# Patient Record
Sex: MALE | Race: WHITE | NOT HISPANIC OR LATINO | Employment: FULL TIME | URBAN - METROPOLITAN AREA
[De-identification: names, ages, dates, MRNs, and addresses within clinical notes are randomized per-mention and may not be internally consistent; named-entity substitution may affect disease eponyms.]

---

## 2017-02-22 ENCOUNTER — TELEPHONE (OUTPATIENT)
Dept: UROLOGY | Facility: CLINIC | Age: 57
End: 2017-02-22

## 2017-03-27 NOTE — TELEPHONE ENCOUNTER
[facility] Prior Authorization Team   Phone: [phone]  Fax: [phone]      PA Initiation    Medication: cialis  Insurance Company: [employer] - Phone [phone] Fax [phone]  Pharmacy Filling the Rx: [id number] [place], [place] - [address] [place] [address] AT [address]  Filling Pharmacy Phone: [phone]  Filling Pharmacy Fax: [phone]  Start Date: 3/27/2017

## 2017-03-28 NOTE — TELEPHONE ENCOUNTER
PRIOR AUTHORIZATION DENIED    Medication: cialis- Denied    Denial Date: 3/27/2017    Denial Rational: Cialis is denied because this drug is excluded from the health plan.  If you feel there is additional information related to this case that might affect this decision and an appeal is desired, please submit a written letter of medical necessity to our PA Team.        Appeal Information:

## 2017-03-30 NOTE — TELEPHONE ENCOUNTER
PRIOR AUTHORIZATION DENIED    Medication: cialis- Denied    Denial Date: 3/29/2017    Denial Rational: Patient must have tried/failed or have a contraindication to the formulary alternatives: Tamsulosin, Alfuzosin Extended release, Doxazosin, Finasteride, Terazosin.  If you feel there is additional information related to this case that might affect this decision and an appeal is desired, please submit a written letter of medical necessity to our PA Team.          Appeal Information:

## 2017-11-27 ENCOUNTER — TRANSFERRED RECORDS (OUTPATIENT)
Dept: HEALTH INFORMATION MANAGEMENT | Facility: CLINIC | Age: 57
End: 2017-11-27

## 2018-03-16 ENCOUNTER — TRANSFERRED RECORDS (OUTPATIENT)
Dept: HEALTH INFORMATION MANAGEMENT | Facility: CLINIC | Age: 58
End: 2018-03-16

## 2019-07-17 ENCOUNTER — TELEPHONE (OUTPATIENT)
Dept: UROLOGY | Facility: CLINIC | Age: 59
End: 2019-07-17

## 2019-07-17 NOTE — TELEPHONE ENCOUNTER
[facility] Call Center    Phone Message    May a detailed message be left on voicemail: yes    Reason for Call: Medication Refill Request    Has the patient contacted the pharmacy for the refill? Yes   Name of medication being requested: Tadalafil (CIALIS) 5mg  Provider who prescribed the medication: Dr Simmons  Pharmacy: [facility] [address] [place] [address] phone [phone]  Date medication is needed: Please call pt to discuss. He stated he stopped using the medication for awhile and recently needed to start taking it again and the medication will be expiring soon per the bottle label. Please call the pt back as soon as you can.          Action Taken: Message routed to:  [facility]

## 2019-07-18 NOTE — TELEPHONE ENCOUNTER
Patient called again about his medication  Message sent to tanya to address Tram Shelley LPN Staff Nurse

## 2019-07-18 NOTE — TELEPHONE ENCOUNTER
Spoke with patient and told him that he has not been seen since 2016  Message sent to dr martínez regarding refill Tram Shelley, JANAY Staff Nurse

## 2019-07-18 NOTE — TELEPHONE ENCOUNTER
[facility] Call Center    Phone Message    May a detailed message be left on voicemail: yes    Reason for Call: Other: Yane calling to follow up on his refill request.  Please call him back with any updates     Action Taken: Message routed to:  [facility]

## 2019-07-22 ENCOUNTER — TELEPHONE (OUTPATIENT)
Dept: UROLOGY | Facility: CLINIC | Age: 59
End: 2019-07-22

## 2019-07-22 DIAGNOSIS — N40.0 BPH (BENIGN PROSTATIC HYPERPLASIA): Primary | ICD-10-CM

## 2019-07-22 RX ORDER — TADALAFIL 5 MG/1
5 TABLET ORAL EVERY 24 HOURS
Qty: 90 TABLET | Refills: 0 | Status: SHIPPED | OUTPATIENT
Start: 2019-07-22 | End: 2022-02-27

## 2019-07-22 NOTE — TELEPHONE ENCOUNTER
----- Message from Chuck Simmons MD sent at 7/19/2019 11:05 AM CDT -----  Yes  refill  ----- Message -----  From: Lili Shelley LPN  Sent: 7/17/2019   9:36 AM  To: Chuck Simmons MD    Patient requesting cialis again  He has not been seen since 2016  He has a lot of issues refuses to come in  Is it ok to refill?? lili

## 2019-07-22 NOTE — TELEPHONE ENCOUNTER
[facility] Call Center    Phone Message    May a detailed message be left on voicemail: yes    Reason for Call: Other: PT requested me to send another message to Tram stating he still has not heard anything regarding his medication refill.       Action Taken: Message routed to:  [facility] urology

## 2019-07-22 NOTE — TELEPHONE ENCOUNTER
Patient called and told he needs to make follow up appt in 3 months for any more refills on cialis. Tram Shelley LPN Staff Nurse

## 2019-08-23 ENCOUNTER — TRANSFERRED RECORDS (OUTPATIENT)
Dept: HEALTH INFORMATION MANAGEMENT | Facility: CLINIC | Age: 59
End: 2019-08-23

## 2019-10-01 ENCOUNTER — TELEPHONE (OUTPATIENT)
Dept: UROLOGY | Facility: CLINIC | Age: 59
End: 2019-10-01

## 2019-10-01 NOTE — TELEPHONE ENCOUNTER
Patient notified that we cannot refill his Cialis prescription. Patient has not been seen since 2016. Patient states that he have several appointments including surgeries. I offered him to schedule an appointment with another Urologist to get this prescription renewed since Dr. Chuck Simmons is out over the next month.  He states that he does not want to see anyone else but Dr. Chuck Simmons. Patient agreed with the plan about scheduling an appointment to renew his Cialis medication.      Jaylene Torres MA

## 2019-10-01 NOTE — TELEPHONE ENCOUNTER
[facility] Call Center    Phone Message    May a detailed message be left on voicemail: yes    Reason for Call: Other: Pt called and wanted to speak to Tram. Pt said that he left a message last week for her and have not heard anything back.  Pt said he will try again tomorrow. Pt said that Tram should know what it's regarding. Thanks     Action Taken: Message routed to:  [facility] urology clinic

## 2019-11-06 ENCOUNTER — TRANSFERRED RECORDS (OUTPATIENT)
Dept: HEALTH INFORMATION MANAGEMENT | Facility: CLINIC | Age: 59
End: 2019-11-06

## 2020-02-27 ENCOUNTER — TRANSFERRED RECORDS (OUTPATIENT)
Dept: HEALTH INFORMATION MANAGEMENT | Facility: CLINIC | Age: 60
End: 2020-02-27

## 2020-11-12 ENCOUNTER — TRANSFERRED RECORDS (OUTPATIENT)
Dept: HEALTH INFORMATION MANAGEMENT | Facility: CLINIC | Age: 60
End: 2020-11-12

## 2020-11-16 ENCOUNTER — TRANSFERRED RECORDS (OUTPATIENT)
Dept: HEALTH INFORMATION MANAGEMENT | Facility: CLINIC | Age: 60
End: 2020-11-16

## 2020-11-24 ENCOUNTER — OFFICE VISIT (OUTPATIENT)
Dept: OPHTHALMOLOGY | Facility: CLINIC | Age: 60
End: 2020-11-24
Attending: OPHTHALMOLOGY
Payer: COMMERCIAL

## 2020-11-24 ENCOUNTER — TELEPHONE (OUTPATIENT)
Dept: OPHTHALMOLOGY | Facility: CLINIC | Age: 60
End: 2020-11-24

## 2020-11-24 DIAGNOSIS — H53.40 VISUAL FIELD DEFECT: ICD-10-CM

## 2020-11-24 DIAGNOSIS — H53.40 VISUAL FIELD DEFECT: Primary | ICD-10-CM

## 2020-11-24 PROCEDURE — 99203 OFFICE O/P NEW LOW 30 MIN: CPT | Mod: GC | Performed by: OPHTHALMOLOGY

## 2020-11-24 PROCEDURE — 92083 EXTENDED VISUAL FIELD XM: CPT | Performed by: OPHTHALMOLOGY

## 2020-11-24 PROCEDURE — G0463 HOSPITAL OUTPT CLINIC VISIT: HCPCS | Performed by: TECHNICIAN/TECHNOLOGIST

## 2020-11-24 ASSESSMENT — CONF VISUAL FIELD
METHOD: COUNTING FINGERS
OS_SUPERIOR_NASAL_RESTRICTION: 3
OD_NORMAL: 1
OS_INFERIOR_TEMPORAL_RESTRICTION: 3

## 2020-11-24 ASSESSMENT — TONOMETRY
IOP_METHOD: ICARE
OS_IOP_MMHG: 14
OD_IOP_MMHG: 14

## 2020-11-24 ASSESSMENT — VISUAL ACUITY
OD_SC+: -1
METHOD: SNELLEN - LINEAR
OS_SC+: -2
OS_SC: 20/80
OD_SC: 20/25

## 2020-11-24 ASSESSMENT — EXTERNAL EXAM - RIGHT EYE: OD_EXAM: NORMAL

## 2020-11-24 ASSESSMENT — SLIT LAMP EXAM - LIDS
COMMENTS: NORMAL
COMMENTS: NORMAL

## 2020-11-24 ASSESSMENT — CUP TO DISC RATIO: OS_RATIO: 0.45

## 2020-11-24 ASSESSMENT — EXTERNAL EXAM - LEFT EYE: OS_EXAM: NORMAL

## 2020-11-24 NOTE — LETTER
2020       Michelet Gonzalez MD  [facility]  [address]  [facility] [address]  Via Fax: [phone]         RE:  :  MRN: Yane Haas  1960  [id number]     Dear Dr. Gonzalez,    Thank you for asking me to see your very pleasant patient, Yane Haas, in Neuro-ophthalmic consultation.  I would like to thank you for sending your records and I have summarized them in the history of present illness.  My assessment and plan are below.  For further details, please see my attached clinic note.      Assessment & Plan     Yane Haas is a 60-year-old male with the following diagnosis:   1. Visual field defect         Yane Haas is a 60-year-old White male who presents to Neuro-ophthalmology clinic today for consultation from [facility] for left eye vision change. Patient feels that the vision in his left eye has been declining in the last few months. He feels that the left eye vision fluctuates. There is intermittent pressure like pain in the left eye that makes him want to rub it.  Patient has multiple eye surgeries in the past with [facility].       POH:  Left eye:  PTK 2020  Conjunctivoresection 2019  LLL external blepharoplasty 2019  MARK punctoplasty 2019  PTK 2018  Therapeutic releaxing arcs 2018  Sulcus IOL 1/10/2018  PK 2016  PTK 12/10/2015  Vitrectomy 2014  PTK 2013  PRK 2013  PTK/PRK 2010    Both eyes:  Strabismus surgery in   PMH: diabetes type 1, HTN, HLD    FH:   NA    Meds:  LEFT eye  Alrex twice daily  Prednisolone once daily   Xiidra BID   Artificial tears PRN   Cosopt BID     Right eye:  Xiidra BID     Visual acuity is 20/25 right eye and 20/80 left eye. Intraocular pressure is 14 both eyes. Pupils briskly reactive with no afferent pupillary defect.  Color plates  RIGHT eye and 10/11 left eye. Slit lamp exam with endothelial pigment RIGHT eye and penetrating keratoplasty  (PK) graft with guttata left eye.  Dilated fundus exam shows normal results.     PAP left eye 20/20 -1     Visual fields showed normal results RIGHT eye and an arcuate defect LEFT eye.  There were 44% false negative errors in the LEFT eye.      It is my impression that he has reduced visual acuity LEFT eye.  He is status-post multiple ocular surgeries LEFT eye.  There is no evidence of an optic neuropathy.  His pupils and color vision are normal.  There is no optic atrophy.  He appears to have a potential acuity of 20/20 or 20/25 suggesting that most of his reduced acuity is the anterior segment.  Follow up with me as needed for worsening symptoms.       Again, thank you for allowing me to participate in the care of your patient.      Sincerely,    Buck Almeida MD  Professor  Ophthalmology Residency   Director of Neuro-Ophthalmology  [facility] Chair  Departments of Ophthalmology, Neurology, and Neurosurgery  [address]  [address]  T - [phone]  F - [phone]  [email or web address]      CC: Chuck Simmons MD  [address] [place] [address]  Via In Basket     Rosina Smiley RN  Via In Basket     Seven Munguia MD  [facility] [address] [facility] [address]  Via Fax: [phone]     Tram Ngo MD   [facility]  [address] [place] [address] [address]  [facility] [address]  Via Fax: [phone]

## 2020-11-24 NOTE — TELEPHONE ENCOUNTER
[facility] Call Center    Phone Message    May a detailed message be left on voicemail: yes     Reason for Call: Other: Pt left Diabetes testing kit in clinic 11/24/2020. Black pouch. Pt would like to come pick it up ASAP once it's been confirmed as still there. Please call Pt back to arrange safe pick-up. Thanks!     Action Taken: Message routed to:  Other: [facility]    Travel Screening: Not Applicable

## 2020-11-24 NOTE — Clinical Note
11/24/2020       RE: Yane Haas  [address] [facility] [address]     Dear Colleague,    Thank you for referring your patient, Yane Haas, to the [facility] at [facility]. Please see a copy of my visit note below.         Assessment & Plan     Yane Haas is a 60 year old male with the following diagnoses:   1. Visual field defect         Yane Haas is a 60 year old White male who presents to neuro-ophthalmology clinic today for consultation from [facility] for left eye vision change. Patient feels that the vision in his left eye has been declining in the last few months. He feels that the left eye vision fluctuates. There is intermittent pressure like pain in the left eye that makes him want to rub it.    Patient has multiple eye surgeries in the past with [facility].       POH:  Left eye:  PTK 6/2020  Conjunctivoresection 11/6/2019  LLL external blepharoplasty 5/8/2019  MARK punctoplasty 5/8/2019  PTK 9/6/2018  Therapeutic releaxing arcs 7/9/2018  Sulcus IOL 1/10/2018  PK 4/27/2016  PTK 12/10/2015  Vitrectomy 4/20/2014  PTK 7/26/2013  PRK 2/14/2013  PTK/PRK 11/2/2010    Both eyes:  Strabismus surgery in 1980  PMH: diabetes type 1, HTN, HLD    FH:   NA    Meds:  LEFT eye  Alrex twice daily  Prednisolone once daily   Xiidra BID   Artificial tears PRN   Cosopt BID     Right eye:  Xiidra BID     Visual acuity is 20/25 right and 20/80 left eye. Intraocular pressure is 14 both eyes. Pupils briskly reactive with no afferent pupillary defect.  Color plates 11/11 RIGHT eye and 10/11 left eye. Slit lamp exam with endothelial pigment RIGHT eye and penetrating keratoplasty (PK) graft with guttata left eye.  Dilated fundus exam shows normal results.     PAP left eye 20/20 -1     Visual fields showed normal results RIGHT eye and an arcuate defect LEFT eye.  There were 44% false negative errors in the LEFT eye.       It is my impression that he has reduced visual acuity LEFT eye.  He is status-post multiple ocular surgeries LEFT eye.  There is no evidence of an optic neuropathy.  His pupils and color vision are normal.  There is no optic atrophy.  He appears to have a potential acuity of 20/20 or 20/25 suggesting that most of his reduced acuity is the anterior segment.  Follow up with me as needed for worsening symptoms.           Attending Physician Attestation:  Complete documentation of historical and exam elements from today's encounter can be found in the full encounter summary report (not reduplicated in this progress note).  I personally obtained the chief complaint(s) and history of present illness.  I confirmed and edited as necessary the review of systems, past medical/surgical history, family history, social history, and examination findings as documented by others; and I examined the patient myself.  I personally reviewed the relevant tests, images, and reports as documented above.  I formulated and edited as necessary the assessment and plan and discussed the findings and management plan with the patient and family. I personally reviewed the ophthalmic test(s) associated with this encounter, agree with the interpretation(s) as documented by the resident/fellow, and have edited the corresponding report(s) as necessary.  - Buck Trevino MD  Ophthalmology PGY-3          Again, thank you for allowing me to participate in the care of your patient.      Sincerely,    Buck Almeida MD

## 2020-11-24 NOTE — TELEPHONE ENCOUNTER
Spoke to patient.  He will  in AM.  Will place behind the .      Jamila Garrett on 11/24/2020 at 5:05 PM

## 2020-12-10 NOTE — PROGRESS NOTES
Assessment & Plan     Yane Haas is a 60 year old male with the following diagnoses:   1. Visual field defect         Yane Haas is a 60 year old White male who presents to neuro-ophthalmology clinic today for consultation from [facility] for left eye vision change. Patient feels that the vision in his left eye has been declining in the last few months. He feels that the left eye vision fluctuates. There is intermittent pressure like pain in the left eye that makes him want to rub it.    Patient has multiple eye surgeries in the past with [facility].       POH:  Left eye:  PTK 6/2020  Conjunctivoresection 11/6/2019  LLL external blepharoplasty 5/8/2019  MARK punctoplasty 5/8/2019  PTK 9/6/2018  Therapeutic releaxing arcs 7/9/2018  Sulcus IOL 1/10/2018  PK 4/27/2016  PTK 12/10/2015  Vitrectomy 4/20/2014  PTK 7/26/2013  PRK 2/14/2013  PTK/PRK 11/2/2010    Both eyes:  Strabismus surgery in 1980  PMH: diabetes type 1, HTN, HLD    FH:   NA    Meds:  LEFT eye  Alrex twice daily  Prednisolone once daily   Xiidra BID   Artificial tears PRN   Cosopt BID     Right eye:  Xiidra BID     Visual acuity is 20/25 right and 20/80 left eye. Intraocular pressure is 14 both eyes. Pupils briskly reactive with no afferent pupillary defect.  Color plates 11/11 RIGHT eye and 10/11 left eye. Slit lamp exam with endothelial pigment RIGHT eye and penetrating keratoplasty (PK) graft with guttata left eye.  Dilated fundus exam shows normal results.     PAP left eye 20/20 -1     Visual fields showed normal results RIGHT eye and an arcuate defect LEFT eye.  There were 44% false negative errors in the LEFT eye.      It is my impression that he has reduced visual acuity LEFT eye.  He is status-post multiple ocular surgeries LEFT eye.  There is no evidence of an optic neuropathy.  His pupils and color vision are normal.  There is no optic atrophy.  He appears to have a potential acuity of 20/20 or  20/25 suggesting that most of his reduced acuity is the anterior segment.  Follow up with me as needed for worsening symptoms.           Attending Physician Attestation:  Complete documentation of historical and exam elements from today's encounter can be found in the full encounter summary report (not reduplicated in this progress note).  I personally obtained the chief complaint(s) and history of present illness.  I confirmed and edited as necessary the review of systems, past medical/surgical history, family history, social history, and examination findings as documented by others; and I examined the patient myself.  I personally reviewed the relevant tests, images, and reports as documented above.  I formulated and edited as necessary the assessment and plan and discussed the findings and management plan with the patient and family. I personally reviewed the ophthalmic test(s) associated with this encounter, agree with the interpretation(s) as documented by the resident/fellow, and have edited the corresponding report(s) as necessary.  - Buck Trevino MD  Ophthalmology PGY-3       Statement Selected

## 2022-02-08 DIAGNOSIS — Z11.59 ENCOUNTER FOR SCREENING FOR OTHER VIRAL DISEASES: Primary | ICD-10-CM

## 2022-02-24 ENCOUNTER — LAB (OUTPATIENT)
Dept: LAB | Facility: CLINIC | Age: 62
End: 2022-02-24
Attending: ORTHOPAEDIC SURGERY
Payer: COMMERCIAL

## 2022-02-24 DIAGNOSIS — Z11.59 ENCOUNTER FOR SCREENING FOR OTHER VIRAL DISEASES: ICD-10-CM

## 2022-02-24 PROCEDURE — U0005 INFEC AGEN DETEC AMPLI PROBE: HCPCS

## 2022-02-24 PROCEDURE — U0003 INFECTIOUS AGENT DETECTION BY NUCLEIC ACID (DNA OR RNA); SEVERE ACUTE RESPIRATORY SYNDROME CORONAVIRUS 2 (SARS-COV-2) (CORONAVIRUS DISEASE [COVID-19]), AMPLIFIED PROBE TECHNIQUE, MAKING USE OF HIGH THROUGHPUT TECHNOLOGIES AS DESCRIBED BY CMS-2020-01-R: HCPCS

## 2022-02-25 LAB — SARS-COV-2 RNA RESP QL NAA+PROBE: NEGATIVE

## 2022-02-27 RX ORDER — TIOTROPIUM BROMIDE 18 UG/1
18 CAPSULE ORAL; RESPIRATORY (INHALATION) DAILY
COMMUNITY
Start: 2021-01-25 | End: 2022-02-27

## 2022-02-27 RX ORDER — AZELASTINE 1 MG/ML
1 SPRAY, METERED NASAL 2 TIMES DAILY
COMMUNITY

## 2022-02-27 RX ORDER — PREDNISOLONE ACETATE 10 MG/ML
1 SUSPENSION/ DROPS OPHTHALMIC 2 TIMES DAILY
COMMUNITY

## 2022-02-27 RX ORDER — BRIMONIDINE TARTRATE AND TIMOLOL MALEATE 2; 5 MG/ML; MG/ML
1 SOLUTION OPHTHALMIC 2 TIMES DAILY
COMMUNITY

## 2022-02-27 RX ORDER — ASPIRIN 81 MG/1
81 TABLET ORAL EVERY EVENING
Status: ON HOLD | COMMUNITY
End: 2022-03-01

## 2022-02-27 RX ORDER — ESCITALOPRAM OXALATE 10 MG/1
10 TABLET ORAL DAILY
COMMUNITY

## 2022-02-27 RX ORDER — INSULIN DEGLUDEC 100 U/ML
11 INJECTION, SOLUTION SUBCUTANEOUS AT BEDTIME
COMMUNITY

## 2022-02-27 RX ORDER — CHLORAL HYDRATE 500 MG
2 CAPSULE ORAL DAILY
COMMUNITY

## 2022-02-27 RX ORDER — TIOTROPIUM BROMIDE 18 UG/1
18 CAPSULE ORAL; RESPIRATORY (INHALATION) DAILY
COMMUNITY

## 2022-02-27 RX ORDER — ROSUVASTATIN CALCIUM 40 MG/1
40 TABLET, COATED ORAL AT BEDTIME
COMMUNITY

## 2022-02-27 RX ORDER — TADALAFIL 5 MG/1
5 TABLET ORAL EVERY 24 HOURS
COMMUNITY

## 2022-02-27 NOTE — PROGRESS NOTES
PTA medications updated by Medication Scribe prior to surgery via phone call with patient (last doses completed by Nurse)     Medication history sources: Patient, Surescripts, H&P and Patient's home med list  In the past week, patient estimated taking medication this percent of the time: Greater than 90%  Adherence assessment: N/A Not Observed    Significant changes made to the medication list:  Patient reports no longer taking the following meds (med scribe removed from PTA med list): Atorvastatin, Lantus      Additional medication history information:   Patient brought own home meds: Spiriva Inahler Caps, Combigan Eye Drops, Azelastine Nasal Spray, Prednisolone Eye Suspension     Medication reconciliation completed by provider prior to medication history? No    Time spent in this activity: 30 minutes    The information provided in this note is only as accurate as the sources available at the time of update(s)    Prior to Admission medications    Medication Sig Last Dose Taking? Auth Provider   aspirin 81 MG EC tablet Take 81 mg by mouth every evening 2/27/2022 at pm Yes Reported, Patient   azelastine (ASTELIN) 0.1 % nasal spray Spray 1 spray into both nostrils 2 times daily 2/27/2022 at pm Yes Reported, Patient   brimonidine-timolol (COMBIGAN) 0.2-0.5 % ophthalmic solution Place 1 drop Into the left eye 2 times daily 2/27/2022 at pm Yes Reported, Patient   CYCLOSPORINE IN KLARITY OP Place 1 drop Into the left eye 2 times daily 2/27/2022 at pm Yes Reported, Patient   escitalopram (LEXAPRO) 10 MG tablet Take 10 mg by mouth daily  at am Yes Reported, Patient   fish oil-omega-3 fatty acids 1000 MG capsule Take 2 g by mouth daily 2/21/2022 at am Yes Reported, Patient   Insulin Aspart (NOVOLOG SC) Inject Subcutaneous 3 times daily (with meals) Sliding Scale  at prn Yes Reported, Patient   insulin degludec (TRESIBA FLEXTOUCH) 100 UNIT/ML pen Inject 11 Units Subcutaneous At Bedtime 2/27/2022 at pm Yes Reported, Patient    lisinopril (ZESTRIL) 10 MG tablet Take 10 mg by mouth daily  2/27/2022 at pm Yes Reported, Patient   Multiple Vitamin (MULTIVITAMIN PO) Take 1 capsule by mouth daily 2/27/2022 at am Yes Reported, Patient   prednisoLONE acetate (PRED FORTE) 1 % ophthalmic suspension Place 1 drop Into the left eye 2 times daily 2/27/2022 at pm Yes Reported, Patient   rosuvastatin (CRESTOR) 40 MG tablet Take 40 mg by mouth At Bedtime 2/27/2022 at pm Yes Reported, Patient   tadalafil (CIALIS) 5 MG tablet Take 5 mg by mouth every 24 hours  at am Yes Reported, Patient   tiotropium (SPIRIVA) 18 MCG inhaled capsule Inhale 18 mcg into the lungs daily 2/27/2022 at am Yes Reported, Patient   traZODone (DESYREL) 50 MG tablet Take 100 mg by mouth At Bedtime (2 x 50 mg) 2/27/2022 at pm Yes Reported, Patient     Medication history completed by:    Rc Bailon CPhT  Medication [facility]

## 2022-02-28 ENCOUNTER — ANESTHESIA (OUTPATIENT)
Dept: SURGERY | Facility: CLINIC | Age: 62
End: 2022-02-28
Payer: COMMERCIAL

## 2022-02-28 ENCOUNTER — APPOINTMENT (OUTPATIENT)
Dept: PHYSICAL THERAPY | Facility: CLINIC | Age: 62
End: 2022-02-28
Attending: ORTHOPAEDIC SURGERY
Payer: COMMERCIAL

## 2022-02-28 ENCOUNTER — APPOINTMENT (OUTPATIENT)
Dept: GENERAL RADIOLOGY | Facility: CLINIC | Age: 62
End: 2022-02-28
Attending: ORTHOPAEDIC SURGERY
Payer: COMMERCIAL

## 2022-02-28 ENCOUNTER — ANESTHESIA EVENT (OUTPATIENT)
Dept: SURGERY | Facility: CLINIC | Age: 62
End: 2022-02-28
Payer: COMMERCIAL

## 2022-02-28 ENCOUNTER — HOSPITAL ENCOUNTER (OUTPATIENT)
Facility: CLINIC | Age: 62
Discharge: HOME | End: 2022-03-01
Attending: ORTHOPAEDIC SURGERY | Admitting: ORTHOPAEDIC SURGERY
Payer: COMMERCIAL

## 2022-02-28 DIAGNOSIS — Z96.651 HISTORY OF PROSTHETIC UNICOMPARTMENTAL ARTHROPLASTY OF RIGHT KNEE: Primary | ICD-10-CM

## 2022-02-28 DIAGNOSIS — Z96.651 STATUS POST TOTAL KNEE REPLACEMENT, RIGHT: ICD-10-CM

## 2022-02-28 LAB
FASTING STATUS PATIENT QL REPORTED: YES
GLUCOSE BLD-MCNC: 139 MG/DL (ref 70–99)
GLUCOSE BLDC GLUCOMTR-MCNC: 110 MG/DL (ref 70–99)
GLUCOSE BLDC GLUCOMTR-MCNC: 117 MG/DL (ref 70–99)
GLUCOSE BLDC GLUCOMTR-MCNC: 127 MG/DL (ref 70–99)
GLUCOSE BLDC GLUCOMTR-MCNC: 242 MG/DL (ref 70–99)
GLUCOSE BLDC GLUCOMTR-MCNC: 293 MG/DL (ref 70–99)
GLUCOSE BLDC GLUCOMTR-MCNC: 83 MG/DL (ref 70–99)
GLUCOSE BLDC GLUCOMTR-MCNC: 89 MG/DL (ref 70–99)
GLUCOSE BLDC GLUCOMTR-MCNC: 90 MG/DL (ref 70–99)
POTASSIUM BLD-SCNC: 4.4 MMOL/L (ref 3.4–5.3)

## 2022-02-28 PROCEDURE — 278N000051 HC OR IMPLANT GENERAL: Performed by: ORTHOPAEDIC SURGERY

## 2022-02-28 PROCEDURE — 250N000011 HC RX IP 250 OP 636: Performed by: ORTHOPAEDIC SURGERY

## 2022-02-28 PROCEDURE — 258N000003 HC RX IP 258 OP 636: Performed by: ORTHOPAEDIC SURGERY

## 2022-02-28 PROCEDURE — 360N000077 HC SURGERY LEVEL 4, PER MIN: Performed by: ORTHOPAEDIC SURGERY

## 2022-02-28 PROCEDURE — 250N000013 HC RX MED GY IP 250 OP 250 PS 637: Performed by: NURSE PRACTITIONER

## 2022-02-28 PROCEDURE — 99207 PR CDG-CODE CATEGORY CHANGED: CPT | Performed by: NURSE PRACTITIONER

## 2022-02-28 PROCEDURE — 97116 GAIT TRAINING THERAPY: CPT | Mod: GP

## 2022-02-28 PROCEDURE — 250N000013 HC RX MED GY IP 250 OP 250 PS 637: Performed by: HOSPITALIST

## 2022-02-28 PROCEDURE — 258N000003 HC RX IP 258 OP 636: Performed by: NURSE ANESTHETIST, CERTIFIED REGISTERED

## 2022-02-28 PROCEDURE — 370N000017 HC ANESTHESIA TECHNICAL FEE, PER MIN: Performed by: ORTHOPAEDIC SURGERY

## 2022-02-28 PROCEDURE — 97530 THERAPEUTIC ACTIVITIES: CPT | Mod: GP

## 2022-02-28 PROCEDURE — 250N000009 HC RX 250: Performed by: NURSE ANESTHETIST, CERTIFIED REGISTERED

## 2022-02-28 PROCEDURE — 96372 THER/PROPH/DIAG INJ SC/IM: CPT | Mod: 59 | Performed by: NURSE PRACTITIONER

## 2022-02-28 PROCEDURE — 250N000025 HC SEVOFLURANE, PER MIN: Performed by: ORTHOPAEDIC SURGERY

## 2022-02-28 PROCEDURE — 250N000013 HC RX MED GY IP 250 OP 250 PS 637: Performed by: ORTHOPAEDIC SURGERY

## 2022-02-28 PROCEDURE — 99203 OFFICE O/P NEW LOW 30 MIN: CPT | Performed by: NURSE PRACTITIONER

## 2022-02-28 PROCEDURE — 258N000003 HC RX IP 258 OP 636: Performed by: ANESTHESIOLOGY

## 2022-02-28 PROCEDURE — 250N000009 HC RX 250: Performed by: ORTHOPAEDIC SURGERY

## 2022-02-28 PROCEDURE — 36415 COLL VENOUS BLD VENIPUNCTURE: CPT | Performed by: ANESTHESIOLOGY

## 2022-02-28 PROCEDURE — 272N000001 HC OR GENERAL SUPPLY STERILE: Performed by: ORTHOPAEDIC SURGERY

## 2022-02-28 PROCEDURE — 97161 PT EVAL LOW COMPLEX 20 MIN: CPT | Mod: GP

## 2022-02-28 PROCEDURE — 999N000063 XR KNEE PORT RIGHT 1/2 VIEWS: Mod: RT

## 2022-02-28 PROCEDURE — 82962 GLUCOSE BLOOD TEST: CPT

## 2022-02-28 PROCEDURE — 250N000011 HC RX IP 250 OP 636: Performed by: NURSE ANESTHETIST, CERTIFIED REGISTERED

## 2022-02-28 PROCEDURE — 84132 ASSAY OF SERUM POTASSIUM: CPT | Performed by: ANESTHESIOLOGY

## 2022-02-28 PROCEDURE — 999N000141 HC STATISTIC PRE-PROCEDURE NURSING ASSESSMENT: Performed by: ORTHOPAEDIC SURGERY

## 2022-02-28 PROCEDURE — 250N000011 HC RX IP 250 OP 636: Performed by: ANESTHESIOLOGY

## 2022-02-28 PROCEDURE — C1776 JOINT DEVICE (IMPLANTABLE): HCPCS | Performed by: ORTHOPAEDIC SURGERY

## 2022-02-28 PROCEDURE — 710N000009 HC RECOVERY PHASE 1, LEVEL 1, PER MIN: Performed by: ORTHOPAEDIC SURGERY

## 2022-02-28 PROCEDURE — 250N000012 HC RX MED GY IP 250 OP 636 PS 637: Performed by: NURSE PRACTITIONER

## 2022-02-28 PROCEDURE — 82947 ASSAY GLUCOSE BLOOD QUANT: CPT | Mod: 91 | Performed by: ANESTHESIOLOGY

## 2022-02-28 DEVICE — IMPLANTABLE DEVICE: Type: IMPLANTABLE DEVICE | Site: KNEE | Status: FUNCTIONAL

## 2022-02-28 DEVICE — KNEE OXFORD UNI FEMORAL MED: Type: IMPLANTABLE DEVICE | Site: KNEE | Status: FUNCTIONAL

## 2022-02-28 DEVICE — BONE CEMENT SIMPLEX FULL DOSE 6191-1-001: Type: IMPLANTABLE DEVICE | Site: KNEE | Status: FUNCTIONAL

## 2022-02-28 RX ORDER — SODIUM CHLORIDE, SODIUM LACTATE, POTASSIUM CHLORIDE, CALCIUM CHLORIDE 600; 310; 30; 20 MG/100ML; MG/100ML; MG/100ML; MG/100ML
INJECTION, SOLUTION INTRAVENOUS CONTINUOUS
Status: DISCONTINUED | OUTPATIENT
Start: 2022-02-28 | End: 2022-02-28 | Stop reason: HOSPADM

## 2022-02-28 RX ORDER — NALOXONE HYDROCHLORIDE 0.4 MG/ML
0.2 INJECTION, SOLUTION INTRAMUSCULAR; INTRAVENOUS; SUBCUTANEOUS
Status: DISCONTINUED | OUTPATIENT
Start: 2022-02-28 | End: 2022-03-01 | Stop reason: HOSPADM

## 2022-02-28 RX ORDER — FENTANYL CITRATE 0.05 MG/ML
50 INJECTION, SOLUTION INTRAMUSCULAR; INTRAVENOUS
Status: DISCONTINUED | OUTPATIENT
Start: 2022-02-28 | End: 2022-02-28 | Stop reason: HOSPADM

## 2022-02-28 RX ORDER — PROPOFOL 10 MG/ML
INJECTION, EMULSION INTRAVENOUS PRN
Status: DISCONTINUED | OUTPATIENT
Start: 2022-02-28 | End: 2022-02-28

## 2022-02-28 RX ORDER — BRIMONIDINE TARTRATE AND TIMOLOL MALEATE 2; 5 MG/ML; MG/ML
1 SOLUTION OPHTHALMIC 2 TIMES DAILY
Status: DISCONTINUED | OUTPATIENT
Start: 2022-02-28 | End: 2022-02-28

## 2022-02-28 RX ORDER — TRAZODONE HYDROCHLORIDE 100 MG/1
100 TABLET ORAL
Status: DISCONTINUED | OUTPATIENT
Start: 2022-02-28 | End: 2022-03-01 | Stop reason: HOSPADM

## 2022-02-28 RX ORDER — AMOXICILLIN 250 MG
1 CAPSULE ORAL 2 TIMES DAILY
Status: DISCONTINUED | OUTPATIENT
Start: 2022-02-28 | End: 2022-03-01 | Stop reason: HOSPADM

## 2022-02-28 RX ORDER — HYDROMORPHONE HCL IN WATER/PF 6 MG/30 ML
0.2 PATIENT CONTROLLED ANALGESIA SYRINGE INTRAVENOUS EVERY 5 MIN PRN
Status: DISCONTINUED | OUTPATIENT
Start: 2022-02-28 | End: 2022-02-28 | Stop reason: HOSPADM

## 2022-02-28 RX ORDER — PREDNISOLONE ACETATE 10 MG/ML
1 SUSPENSION/ DROPS OPHTHALMIC 2 TIMES DAILY
Status: DISCONTINUED | OUTPATIENT
Start: 2022-02-28 | End: 2022-03-01 | Stop reason: HOSPADM

## 2022-02-28 RX ORDER — ACETAMINOPHEN 325 MG/1
975 TABLET ORAL EVERY 8 HOURS
Status: DISCONTINUED | OUTPATIENT
Start: 2022-02-28 | End: 2022-03-01 | Stop reason: HOSPADM

## 2022-02-28 RX ORDER — ASPIRIN 81 MG/1
81 TABLET ORAL 2 TIMES DAILY
Status: DISCONTINUED | OUTPATIENT
Start: 2022-02-28 | End: 2022-03-01 | Stop reason: HOSPADM

## 2022-02-28 RX ORDER — TRANEXAMIC ACID 650 MG/1
1950 TABLET ORAL ONCE
Status: COMPLETED | OUTPATIENT
Start: 2022-02-28 | End: 2022-02-28

## 2022-02-28 RX ORDER — LIDOCAINE 40 MG/G
CREAM TOPICAL
Status: DISCONTINUED | OUTPATIENT
Start: 2022-02-28 | End: 2022-02-28 | Stop reason: HOSPADM

## 2022-02-28 RX ORDER — DIPHENHYDRAMINE HCL 25 MG
25 CAPSULE ORAL EVERY 6 HOURS PRN
Status: DISCONTINUED | OUTPATIENT
Start: 2022-02-28 | End: 2022-03-01 | Stop reason: HOSPADM

## 2022-02-28 RX ORDER — LIDOCAINE 40 MG/G
CREAM TOPICAL
Status: DISCONTINUED | OUTPATIENT
Start: 2022-02-28 | End: 2022-03-01 | Stop reason: HOSPADM

## 2022-02-28 RX ORDER — OXYCODONE HYDROCHLORIDE 5 MG/1
5 TABLET ORAL EVERY 4 HOURS PRN
Status: DISCONTINUED | OUTPATIENT
Start: 2022-02-28 | End: 2022-03-01

## 2022-02-28 RX ORDER — CEFAZOLIN SODIUM/WATER 2 G/20 ML
2 SYRINGE (ML) INTRAVENOUS
Status: COMPLETED | OUTPATIENT
Start: 2022-02-28 | End: 2022-02-28

## 2022-02-28 RX ORDER — ACETAMINOPHEN 325 MG/1
650 TABLET ORAL EVERY 4 HOURS PRN
Qty: 100 TABLET | Refills: 0 | Status: SHIPPED | OUTPATIENT
Start: 2022-02-28

## 2022-02-28 RX ORDER — BRIMONIDINE TARTRATE AND TIMOLOL MALEATE 2; 5 MG/ML; MG/ML
1 SOLUTION OPHTHALMIC 2 TIMES DAILY
Status: DISCONTINUED | OUTPATIENT
Start: 2022-02-28 | End: 2022-03-01 | Stop reason: HOSPADM

## 2022-02-28 RX ORDER — CEFAZOLIN SODIUM/WATER 2 G/20 ML
2 SYRINGE (ML) INTRAVENOUS SEE ADMIN INSTRUCTIONS
Status: DISCONTINUED | OUTPATIENT
Start: 2022-02-28 | End: 2022-02-28 | Stop reason: HOSPADM

## 2022-02-28 RX ORDER — POLYETHYLENE GLYCOL 3350 17 G/17G
17 POWDER, FOR SOLUTION ORAL DAILY
Status: DISCONTINUED | OUTPATIENT
Start: 2022-03-01 | End: 2022-03-01 | Stop reason: HOSPADM

## 2022-02-28 RX ORDER — NALOXONE HYDROCHLORIDE 0.4 MG/ML
0.4 INJECTION, SOLUTION INTRAMUSCULAR; INTRAVENOUS; SUBCUTANEOUS
Status: DISCONTINUED | OUTPATIENT
Start: 2022-02-28 | End: 2022-03-01 | Stop reason: HOSPADM

## 2022-02-28 RX ORDER — HYDROXYZINE HYDROCHLORIDE 25 MG/1
25 TABLET, FILM COATED ORAL EVERY 6 HOURS PRN
Status: DISCONTINUED | OUTPATIENT
Start: 2022-02-28 | End: 2022-02-28 | Stop reason: HOSPADM

## 2022-02-28 RX ORDER — LISINOPRIL 10 MG/1
10 TABLET ORAL AT BEDTIME
Status: DISCONTINUED | OUTPATIENT
Start: 2022-03-01 | End: 2022-03-01 | Stop reason: HOSPADM

## 2022-02-28 RX ORDER — AZELASTINE 1 MG/ML
1 SPRAY, METERED NASAL 2 TIMES DAILY
Status: DISCONTINUED | OUTPATIENT
Start: 2022-02-28 | End: 2022-03-01 | Stop reason: HOSPADM

## 2022-02-28 RX ORDER — TRAZODONE HYDROCHLORIDE 50 MG/1
50 TABLET, FILM COATED ORAL
Status: DISCONTINUED | OUTPATIENT
Start: 2022-02-28 | End: 2022-02-28

## 2022-02-28 RX ORDER — HYDRALAZINE HYDROCHLORIDE 20 MG/ML
2.5-5 INJECTION INTRAMUSCULAR; INTRAVENOUS EVERY 10 MIN PRN
Status: DISCONTINUED | OUTPATIENT
Start: 2022-02-28 | End: 2022-02-28 | Stop reason: HOSPADM

## 2022-02-28 RX ORDER — GLYCOPYRROLATE 0.2 MG/ML
INJECTION, SOLUTION INTRAMUSCULAR; INTRAVENOUS PRN
Status: DISCONTINUED | OUTPATIENT
Start: 2022-02-28 | End: 2022-02-28

## 2022-02-28 RX ORDER — OXYCODONE HYDROCHLORIDE 5 MG/1
5-10 TABLET ORAL
Qty: 30 TABLET | Refills: 0 | Status: SHIPPED | OUTPATIENT
Start: 2022-02-28 | End: 2022-03-01

## 2022-02-28 RX ORDER — HYDROMORPHONE HCL IN WATER/PF 6 MG/30 ML
0.4 PATIENT CONTROLLED ANALGESIA SYRINGE INTRAVENOUS
Status: DISCONTINUED | OUTPATIENT
Start: 2022-02-28 | End: 2022-03-01 | Stop reason: HOSPADM

## 2022-02-28 RX ORDER — ASPIRIN 81 MG/1
81 TABLET ORAL 2 TIMES DAILY
Qty: 60 TABLET | Refills: 0 | Status: SHIPPED | OUTPATIENT
Start: 2022-02-28

## 2022-02-28 RX ORDER — ONDANSETRON 4 MG/1
4 TABLET, ORALLY DISINTEGRATING ORAL EVERY 30 MIN PRN
Status: DISCONTINUED | OUTPATIENT
Start: 2022-02-28 | End: 2022-02-28 | Stop reason: HOSPADM

## 2022-02-28 RX ORDER — PROPOFOL 10 MG/ML
INJECTION, EMULSION INTRAVENOUS CONTINUOUS PRN
Status: DISCONTINUED | OUTPATIENT
Start: 2022-02-28 | End: 2022-02-28

## 2022-02-28 RX ORDER — AMOXICILLIN 250 MG
1-2 CAPSULE ORAL 2 TIMES DAILY
Qty: 30 TABLET | Refills: 0 | Status: SHIPPED | OUTPATIENT
Start: 2022-02-28

## 2022-02-28 RX ORDER — SODIUM CHLORIDE 9 MG/ML
INJECTION, SOLUTION INTRAVENOUS CONTINUOUS
Status: DISCONTINUED | OUTPATIENT
Start: 2022-02-28 | End: 2022-03-01 | Stop reason: HOSPADM

## 2022-02-28 RX ORDER — ONDANSETRON 4 MG/1
4 TABLET, ORALLY DISINTEGRATING ORAL EVERY 6 HOURS PRN
Status: DISCONTINUED | OUTPATIENT
Start: 2022-02-28 | End: 2022-03-01 | Stop reason: HOSPADM

## 2022-02-28 RX ORDER — VANCOMYCIN HYDROCHLORIDE 1 G/20ML
INJECTION, POWDER, LYOPHILIZED, FOR SOLUTION INTRAVENOUS PRN
Status: DISCONTINUED | OUTPATIENT
Start: 2022-02-28 | End: 2022-02-28 | Stop reason: HOSPADM

## 2022-02-28 RX ORDER — PREDNISOLONE ACETATE 10 MG/ML
1 SUSPENSION/ DROPS OPHTHALMIC 2 TIMES DAILY
Status: DISCONTINUED | OUTPATIENT
Start: 2022-02-28 | End: 2022-02-28

## 2022-02-28 RX ORDER — FENTANYL CITRATE 0.05 MG/ML
50 INJECTION, SOLUTION INTRAMUSCULAR; INTRAVENOUS EVERY 5 MIN PRN
Status: DISCONTINUED | OUTPATIENT
Start: 2022-02-28 | End: 2022-02-28 | Stop reason: HOSPADM

## 2022-02-28 RX ORDER — HYDROXYZINE HYDROCHLORIDE 25 MG/1
25 TABLET, FILM COATED ORAL EVERY 6 HOURS PRN
Status: DISCONTINUED | OUTPATIENT
Start: 2022-02-28 | End: 2022-03-01 | Stop reason: HOSPADM

## 2022-02-28 RX ORDER — CALCIUM CARBONATE 500 MG/1
500 TABLET, CHEWABLE ORAL 4 TIMES DAILY PRN
Status: DISCONTINUED | OUTPATIENT
Start: 2022-02-28 | End: 2022-03-01 | Stop reason: HOSPADM

## 2022-02-28 RX ORDER — ONDANSETRON 2 MG/ML
4 INJECTION INTRAMUSCULAR; INTRAVENOUS EVERY 30 MIN PRN
Status: DISCONTINUED | OUTPATIENT
Start: 2022-02-28 | End: 2022-02-28 | Stop reason: HOSPADM

## 2022-02-28 RX ORDER — ONDANSETRON 2 MG/ML
INJECTION INTRAMUSCULAR; INTRAVENOUS PRN
Status: DISCONTINUED | OUTPATIENT
Start: 2022-02-28 | End: 2022-02-28

## 2022-02-28 RX ORDER — ONDANSETRON 2 MG/ML
4 INJECTION INTRAMUSCULAR; INTRAVENOUS EVERY 6 HOURS PRN
Status: DISCONTINUED | OUTPATIENT
Start: 2022-02-28 | End: 2022-03-01 | Stop reason: HOSPADM

## 2022-02-28 RX ORDER — NICOTINE POLACRILEX 4 MG
15-30 LOZENGE BUCCAL
Status: DISCONTINUED | OUTPATIENT
Start: 2022-02-28 | End: 2022-03-01 | Stop reason: HOSPADM

## 2022-02-28 RX ORDER — OXYCODONE HYDROCHLORIDE 5 MG/1
10 TABLET ORAL EVERY 4 HOURS PRN
Status: DISCONTINUED | OUTPATIENT
Start: 2022-02-28 | End: 2022-03-01

## 2022-02-28 RX ORDER — CELECOXIB 200 MG/1
400 CAPSULE ORAL ONCE
Status: COMPLETED | OUTPATIENT
Start: 2022-02-28 | End: 2022-02-28

## 2022-02-28 RX ORDER — EPHEDRINE SULFATE 50 MG/ML
INJECTION, SOLUTION INTRAMUSCULAR; INTRAVENOUS; SUBCUTANEOUS PRN
Status: DISCONTINUED | OUTPATIENT
Start: 2022-02-28 | End: 2022-02-28

## 2022-02-28 RX ORDER — ROSUVASTATIN CALCIUM 20 MG/1
40 TABLET, COATED ORAL AT BEDTIME
Status: DISCONTINUED | OUTPATIENT
Start: 2022-02-28 | End: 2022-03-01 | Stop reason: HOSPADM

## 2022-02-28 RX ORDER — DEXTROSE MONOHYDRATE 25 G/50ML
25-50 INJECTION, SOLUTION INTRAVENOUS
Status: DISCONTINUED | OUTPATIENT
Start: 2022-02-28 | End: 2022-03-01 | Stop reason: HOSPADM

## 2022-02-28 RX ORDER — HYDROMORPHONE HCL IN WATER/PF 6 MG/30 ML
0.2 PATIENT CONTROLLED ANALGESIA SYRINGE INTRAVENOUS
Status: DISCONTINUED | OUTPATIENT
Start: 2022-02-28 | End: 2022-03-01 | Stop reason: HOSPADM

## 2022-02-28 RX ORDER — DIMENHYDRINATE 50 MG/ML
25 INJECTION, SOLUTION INTRAMUSCULAR; INTRAVENOUS
Status: DISCONTINUED | OUTPATIENT
Start: 2022-02-28 | End: 2022-02-28 | Stop reason: HOSPADM

## 2022-02-28 RX ORDER — DIAZEPAM 5 MG
5 TABLET ORAL EVERY 6 HOURS PRN
Status: DISCONTINUED | OUTPATIENT
Start: 2022-02-28 | End: 2022-03-01 | Stop reason: HOSPADM

## 2022-02-28 RX ORDER — ESCITALOPRAM OXALATE 10 MG/1
10 TABLET ORAL DAILY
Status: DISCONTINUED | OUTPATIENT
Start: 2022-03-01 | End: 2022-03-01 | Stop reason: HOSPADM

## 2022-02-28 RX ORDER — FENTANYL CITRATE 50 UG/ML
INJECTION, SOLUTION INTRAMUSCULAR; INTRAVENOUS PRN
Status: DISCONTINUED | OUTPATIENT
Start: 2022-02-28 | End: 2022-02-28

## 2022-02-28 RX ORDER — OXYCODONE HYDROCHLORIDE 5 MG/1
5 TABLET ORAL EVERY 4 HOURS PRN
Status: DISCONTINUED | OUTPATIENT
Start: 2022-02-28 | End: 2022-02-28 | Stop reason: HOSPADM

## 2022-02-28 RX ORDER — LABETALOL HYDROCHLORIDE 5 MG/ML
10 INJECTION, SOLUTION INTRAVENOUS
Status: DISCONTINUED | OUTPATIENT
Start: 2022-02-28 | End: 2022-02-28 | Stop reason: HOSPADM

## 2022-02-28 RX ORDER — CEFAZOLIN SODIUM 1 G/3ML
1 INJECTION, POWDER, FOR SOLUTION INTRAMUSCULAR; INTRAVENOUS EVERY 8 HOURS
Status: COMPLETED | OUTPATIENT
Start: 2022-02-28 | End: 2022-03-01

## 2022-02-28 RX ORDER — ACETAMINOPHEN 325 MG/1
975 TABLET ORAL ONCE
Status: DISCONTINUED | OUTPATIENT
Start: 2022-02-28 | End: 2022-02-28 | Stop reason: HOSPADM

## 2022-02-28 RX ORDER — BISACODYL 10 MG
10 SUPPOSITORY, RECTAL RECTAL DAILY PRN
Status: DISCONTINUED | OUTPATIENT
Start: 2022-02-28 | End: 2022-03-01 | Stop reason: HOSPADM

## 2022-02-28 RX ORDER — ACETAMINOPHEN 325 MG/1
650 TABLET ORAL EVERY 4 HOURS PRN
Status: DISCONTINUED | OUTPATIENT
Start: 2022-03-03 | End: 2022-03-01 | Stop reason: HOSPADM

## 2022-02-28 RX ORDER — BUPIVACAINE HYDROCHLORIDE AND EPINEPHRINE 2.5; 5 MG/ML; UG/ML
INJECTION, SOLUTION EPIDURAL; INFILTRATION; INTRACAUDAL; PERINEURAL PRN
Status: DISCONTINUED | OUTPATIENT
Start: 2022-02-28 | End: 2022-02-28 | Stop reason: HOSPADM

## 2022-02-28 RX ORDER — ACETAMINOPHEN 325 MG/1
975 TABLET ORAL ONCE
Status: COMPLETED | OUTPATIENT
Start: 2022-02-28 | End: 2022-02-28

## 2022-02-28 RX ADMIN — ACETAMINOPHEN 975 MG: 325 TABLET, FILM COATED ORAL at 21:49

## 2022-02-28 RX ADMIN — FENTANYL CITRATE 50 MCG: 50 INJECTION, SOLUTION INTRAMUSCULAR; INTRAVENOUS at 12:27

## 2022-02-28 RX ADMIN — PHENYLEPHRINE HYDROCHLORIDE 100 MCG: 10 INJECTION INTRAVENOUS at 14:05

## 2022-02-28 RX ADMIN — OXYCODONE HYDROCHLORIDE 10 MG: 5 TABLET ORAL at 23:02

## 2022-02-28 RX ADMIN — Medication 15 MG: at 12:02

## 2022-02-28 RX ADMIN — MIDAZOLAM 2 MG: 1 INJECTION INTRAMUSCULAR; INTRAVENOUS at 11:41

## 2022-02-28 RX ADMIN — BRIMONIDINE TARTRATE AND TIMOLOL MALEATE 1 DROP: 2; 5 SOLUTION OPHTHALMIC at 22:05

## 2022-02-28 RX ADMIN — AZELASTINE HYDROCHLORIDE 1 SPRAY: 137 SPRAY, METERED NASAL at 22:02

## 2022-02-28 RX ADMIN — FENTANYL CITRATE 50 MCG: 50 INJECTION, SOLUTION INTRAMUSCULAR; INTRAVENOUS at 15:31

## 2022-02-28 RX ADMIN — PHENYLEPHRINE HYDROCHLORIDE 50 MCG: 10 INJECTION INTRAVENOUS at 13:05

## 2022-02-28 RX ADMIN — PROPOFOL 200 MG: 10 INJECTION, EMULSION INTRAVENOUS at 11:50

## 2022-02-28 RX ADMIN — GLYCOPYRROLATE 0.2 MG: 0.2 INJECTION, SOLUTION INTRAMUSCULAR; INTRAVENOUS at 12:21

## 2022-02-28 RX ADMIN — SODIUM CHLORIDE, POTASSIUM CHLORIDE, SODIUM LACTATE AND CALCIUM CHLORIDE: 600; 310; 30; 20 INJECTION, SOLUTION INTRAVENOUS at 10:23

## 2022-02-28 RX ADMIN — PREDNISOLONE ACETATE 1 DROP: 10 SUSPENSION/ DROPS OPHTHALMIC at 22:25

## 2022-02-28 RX ADMIN — SENNOSIDES AND DOCUSATE SODIUM 1 TABLET: 50; 8.6 TABLET ORAL at 20:41

## 2022-02-28 RX ADMIN — ONDANSETRON 4 MG: 4 TABLET, ORALLY DISINTEGRATING ORAL at 23:02

## 2022-02-28 RX ADMIN — PROPOFOL 50 MCG/KG/MIN: 10 INJECTION, EMULSION INTRAVENOUS at 11:53

## 2022-02-28 RX ADMIN — PHENYLEPHRINE HYDROCHLORIDE 50 MCG: 10 INJECTION INTRAVENOUS at 12:55

## 2022-02-28 RX ADMIN — FENTANYL CITRATE 50 MCG: 50 INJECTION, SOLUTION INTRAMUSCULAR; INTRAVENOUS at 11:50

## 2022-02-28 RX ADMIN — TRAZODONE HYDROCHLORIDE 100 MG: 100 TABLET ORAL at 23:03

## 2022-02-28 RX ADMIN — Medication 2 G: at 11:41

## 2022-02-28 RX ADMIN — MIDAZOLAM 2 MG: 1 INJECTION INTRAMUSCULAR; INTRAVENOUS at 10:23

## 2022-02-28 RX ADMIN — ROSUVASTATIN CALCIUM 40 MG: 20 TABLET, FILM COATED ORAL at 21:49

## 2022-02-28 RX ADMIN — PHENYLEPHRINE HYDROCHLORIDE 100 MCG: 10 INJECTION INTRAVENOUS at 14:13

## 2022-02-28 RX ADMIN — PHENYLEPHRINE HYDROCHLORIDE 100 MCG: 10 INJECTION INTRAVENOUS at 12:02

## 2022-02-28 RX ADMIN — INSULIN DEGLUDEC INJECTION 11 UNITS: 100 INJECTION, SOLUTION SUBCUTANEOUS at 21:51

## 2022-02-28 RX ADMIN — CEFAZOLIN 1 G: 1 INJECTION, POWDER, FOR SOLUTION INTRAMUSCULAR; INTRAVENOUS at 20:42

## 2022-02-28 RX ADMIN — HYDROMORPHONE HYDROCHLORIDE 0.5 MG: 1 INJECTION, SOLUTION INTRAMUSCULAR; INTRAVENOUS; SUBCUTANEOUS at 12:31

## 2022-02-28 RX ADMIN — SODIUM CHLORIDE: 9 INJECTION, SOLUTION INTRAVENOUS at 18:13

## 2022-02-28 RX ADMIN — TRANEXAMIC ACID 1950 MG: 650 TABLET ORAL at 09:56

## 2022-02-28 RX ADMIN — Medication 10 MG: at 11:58

## 2022-02-28 RX ADMIN — HYDROMORPHONE HYDROCHLORIDE 0.2 MG: 0.2 INJECTION, SOLUTION INTRAMUSCULAR; INTRAVENOUS; SUBCUTANEOUS at 22:04

## 2022-02-28 RX ADMIN — INSULIN ASPART 12 UNITS: 100 INJECTION, SOLUTION INTRAVENOUS; SUBCUTANEOUS at 18:45

## 2022-02-28 RX ADMIN — ACETAMINOPHEN 975 MG: 325 TABLET, FILM COATED ORAL at 09:56

## 2022-02-28 RX ADMIN — PHENYLEPHRINE HYDROCHLORIDE 0.5 MCG/KG/MIN: 10 INJECTION INTRAVENOUS at 12:04

## 2022-02-28 RX ADMIN — PHENYLEPHRINE HYDROCHLORIDE 100 MCG: 10 INJECTION INTRAVENOUS at 13:59

## 2022-02-28 RX ADMIN — ASPIRIN 81 MG: 81 TABLET, COATED ORAL at 20:41

## 2022-02-28 RX ADMIN — ONDANSETRON 4 MG: 2 INJECTION INTRAMUSCULAR; INTRAVENOUS at 13:52

## 2022-02-28 RX ADMIN — OXYCODONE HYDROCHLORIDE 5 MG: 5 TABLET ORAL at 18:27

## 2022-02-28 RX ADMIN — UMECLIDINIUM 1 PUFF: 62.5 AEROSOL, POWDER ORAL at 18:47

## 2022-02-28 RX ADMIN — SODIUM CHLORIDE, POTASSIUM CHLORIDE, SODIUM LACTATE AND CALCIUM CHLORIDE: 600; 310; 30; 20 INJECTION, SOLUTION INTRAVENOUS at 13:15

## 2022-02-28 RX ADMIN — CELECOXIB 400 MG: 200 CAPSULE ORAL at 09:56

## 2022-02-28 ASSESSMENT — LIFESTYLE VARIABLES: TOBACCO_USE: 0

## 2022-02-28 NOTE — ANESTHESIA PROCEDURE NOTES
Adductor canal (targeting saphenous nerve) Procedure Note    Pre-Procedure   Staff -        Anesthesiologist:  Clay Hopper MD       Performed By: anesthesiologist       Location: pre-op       Pre-Anesthestic Checklist: patient identified, IV checked, site marked, risks and benefits discussed, informed consent, monitors and equipment checked, pre-op evaluation, at physician/surgeon's request and post-op pain management  Timeout:       Correct Patient: Yes        Correct Procedure: Yes        Correct Site: Yes        Correct Position: Yes        Correct Laterality: Yes        Site Marked: Yes  Procedure Documentation  Procedure: Adductor canal (targeting saphenous nerve)       Patient Position: supine       Skin prep: Chloraprep       Local skin infiltrated with 1 mL of 1% lidocaine.        Needle Type: insulated       Needle Gauge: 21.        Needle Length (millimeters): 90        Ultrasound guided       1. Ultrasound was used to identify targeted nerve, plexus, vascular marker, or fascial plane and place a needle adjacent to it in real-time.       2. Ultrasound was used to visualize the spread of anesthetic in close proximity to the above referenced structure.       3. A permanent image is entered into the patient's record.       4. The visualized anatomic structures appeared normal.       5. There were no apparent abnormal pathologic findings.    Assessment/Narrative         The placement was negative for: blood aspirated, painful injection and site bleeding       Paresthesias: No.     Bolus given via needle..        Secured via.        Insertion/Infusion Method: Single Shot       Complications: none     Comments:  Bolus via needle, 15 mL of 0.5% ropivacaine with 1:400,000 epinephrine.    Under ultrasound guidance, a 21 gauge needle was inserted and placed in close proximity to the saphenous nerve. Ultrasound was also used to visualize the spread of anesthetic in close proximity to the nerve being  blocked. The nerve appeared anatomically normal, and there were no apparent abnormal pathological findings. Patient tolerated well, was mildly sedated but communicative throughout the procedure. A permanent ultrasound image was saved in the patient's record.    The surgeon has given a verbal order transferring care of this patient to me for the performance of regional analgesia block for post op pain control. It is requested of me because I am uniquely trained and qualified to perform this block and the surgeon is neither trained nor qualified to perform this procedure.

## 2022-02-28 NOTE — CONSULTS
[facility]  Consult Note - Hospitalist Service     Date of Admission:  2/28/2022  Consult Requested by: Dr. Galvin  Reason for Consult: Post-op diabetic and medical management  PRIMARY CARE PROVIDER:    Tristan Jason    Assessment & Plan   Yane MENENDEZ Waldo is a 61 year old male with a past medical history significant for controlled DM Type 1, HTN, HLD, anxiety, obstructive lung disease, and OA of the right knee. He was admitted on 2/28/22 and underwent elective right unicompartmental knee arthroplast with Dr. Galvin. EBL of 25 ml and uncomplicated operative course. Hospitalist service was consulted to assist with post-op medical and diabetic management.       POD #0 s/p Right Unicompartmental Knee Arthroplasty  OA Right Knee  Uncomplicated operative course. EBL of 25 ml. Pain controlled with PRN meds.    PLAN:  -Post-op management as per ortho  -Pain meds, activity, anticoagulation, IVF fluids, therapies as per Ortho  -PT/OT  -BM regimen  -PRN pain meds  -IS    DM Type 1 with Proliferative Retinopathy of Both Eyes and Macular Edema  Follows OP with Endocrinology. PTA regimen of Tresiba 11 units at night and sliding scale insulin TID. Will repeat A1c in AM. Has had issues with hypoglycemia according to chart review from his last Endo visit on 2/24/22.     PLAN:  -Glucose checks as per order  -Medium scale sliding scale insulin with carb coverage  -DM diet  -Long acting insulin at PTA dose of 11 units  -Adjust insulin for hyper/hypoglycemia  -Resume PTA eye drops--pt may use home supply that he brought with him  -OP follow-up with Endocrinology, as needed  -Resume PTA regimen at discharge    Hypertension  Vitally stable. BP's in the low 100's over 60's. On lisinopril PTA. Will resume with hold parameters in place. Monitor VS.    Anxiety  Noted and stable. Resume PTA lexapro.     Obstructive Lung Disease  Noted. Not on chronic oxygen at home. Currently on nasal cannula post op.      PLAN:  -Pulmonary toileting  -Resume PTA Spiriva--will utilize hospital substitution  -Wean oxygen to maintain sats >90%  -Cough and deep breathe, IS    HLD  Noted. Resume statin.      Diet: Discharge Instruction - Regular Diet Adult  Moderate Consistent Carb (60 g CHO per Meal) Diet     DVT Prophylaxis: As per Ortho  Hernandes Catheter: Not present  Code Status: Full Code     Disposition Plan    Expected Discharge: TBD, pending clinical course and therapy recommendations. Anticipate discharge in the next 24-48 hours, pending medical stability.  Anticipated discharge location: home    Delays:         Entered: Jamia Martin NP 02/28/2022, 5:22 PM        The patient's care was discussed with the Patient.    The patient has been discussed with Dr. Goetz, who agrees with the assessment and plan at this time.      The hospitalist service would like to thank Orthopedics for the consult.  We will continue to follow.         Jamia Martin NP  [facility]    ______________________________________________________________________    Chief Complaint   Post-op medical and DM managment    History is obtained from the patient and EMR.      History of Present Illness   Yane Haas is a 61 year old male with a past medical history significant for controlled DM Type 1, HTN, HLD, anxiety, obstructive lung disease, and OA of the right knee. He was admitted on 2/28/22 and underwent elective right unicompartmental knee arthroplast with Dr. Galvin. EBL of 25 ml and uncomplicated operative course. Hospitalist service was consulted to assist with post-op medical and diabetic management.     Patient is resting in bed. He is having pain in the right knee. He is unclear as to the plans for therapy and equipment that he might need at discharge. He states that he has plenty of help at home and lives with his wife. States that he has had 19 eye surgeries with more pending. Has some blurriness of vision and this is not  new. He brought in his home supply of eye drops. He is hungry. Denies further complaints.    Review of Systems   The 10 point Review of Systems is negative other than noted in the HPI.    Past Medical History    I have reviewed this patient's medical history and updated it with pertinent information if needed.   Past Medical History:   Diagnosis Date     Anxiety      Diabetes type 1, controlled (H)     Type 1 dx 2010     Hyperlipidemia      Hypertension     will hold lisinopril am 6-29-16     Obstructive lung disease (H)      Peyronie disease        Past Surgical History   I have reviewed this patient's surgical history and updated it with pertinent information if needed.  Past Surgical History:   Procedure Laterality Date     ARTHROSCOPY KNEE WITH MENISCAL REPAIR Bilateral      EYE SURGERY      Multiple left eye procedures including corneal revision and transplant      GENITOURINARY SURGERY      Cysto 1-2 years ago before peyronies started     AAMIR PROCEDURE N/A 6/29/2016    Procedure: AAMIR PROCEDURE;  Surgeon: Chuck Simmons MD;  Location:  OR     [facility] REVISE CORNEA WITH IMPLANT         Social History   I have reviewed this patient's social history and updated it with pertinent information if needed.  Patient resides at home.   Social History     Tobacco Use     Smoking status: Never Smoker     Smokeless tobacco: Never Used   Vaping Use     Vaping Use: Never used   Substance Use Topics     Alcohol use: No     Drug use: No       Family History   I have reviewed this patient's family history and updated it with pertinent information if needed.   History reviewed. No pertinent family history.    Medications   Prior to Admission Medications   Prescriptions Last Dose Informant Patient Reported? Taking?   CYCLOSPORINE IN KLARITY OP 2/27/2022 at pm Self Yes Yes   Sig: Place 1 drop Into the left eye 2 times daily   Insulin Aspart (NOVOLOG SC)  at prn Self Yes Yes   Sig: Inject Subcutaneous 3 times daily (with  meals) Sliding Scale   Multiple Vitamin (MULTIVITAMIN PO) 2/27/2022 at am Self Yes Yes   Sig: Take 1 capsule by mouth daily   aspirin 81 MG EC tablet 2/27/2022 at pm Self Yes Yes   Sig: Take 81 mg by mouth every evening   azelastine (ASTELIN) 0.1 % nasal spray 2/27/2022 at pm Self Yes Yes   Sig: Spray 1 spray into both nostrils 2 times daily   brimonidine-timolol (COMBIGAN) 0.2-0.5 % ophthalmic solution 2/27/2022 at pm Self Yes Yes   Sig: Place 1 drop Into the left eye 2 times daily   escitalopram (LEXAPRO) 10 MG tablet 2/28/2022 at 0630 Self Yes Yes   Sig: Take 10 mg by mouth daily   fish oil-omega-3 fatty acids 1000 MG capsule 2/21/2022 at am Self Yes Yes   Sig: Take 2 g by mouth daily   insulin degludec (TRESIBA FLEXTOUCH) 100 UNIT/ML pen 2/27/2022 at pm Self Yes Yes   Sig: Inject 11 Units Subcutaneous At Bedtime   lisinopril (ZESTRIL) 10 MG tablet 2/27/2022 at pm Self Yes Yes   Sig: Take 10 mg by mouth daily    prednisoLONE acetate (PRED FORTE) 1 % ophthalmic suspension 2/27/2022 at pm Self Yes Yes   Sig: Place 1 drop Into the left eye 2 times daily   rosuvastatin (CRESTOR) 40 MG tablet 2/27/2022 at pm Self Yes Yes   Sig: Take 40 mg by mouth At Bedtime   tadalafil (CIALIS) 5 MG tablet  at am Self Yes Yes   Sig: Take 5 mg by mouth every 24 hours   tiotropium (SPIRIVA) 18 MCG inhaled capsule 2/27/2022 at am Self Yes Yes   Sig: Inhale 18 mcg into the lungs daily   traZODone (DESYREL) 50 MG tablet 2/27/2022 at pm Self Yes Yes   Sig: Take 100 mg by mouth At Bedtime (2 x 50 mg)      Facility-Administered Medications: None     Allergies   Allergies   Allergen Reactions     Codeine Other (See Comments)     Had it as a child. Trouble breathing. Has tolerated percocet.     Compazine Other (See Comments)     Had as a child. Trouble breathing.     Tacrolimus        Physical Exam   Vital Signs: Temp: 97.2  F (36.2  C) Temp src: Temporal BP: 101/63 Pulse: 78   Resp: 9 SpO2: 97 % O2 Device: None (Room air) Oxygen Delivery: 3  LPM  Weight: 164 lbs 14.4 oz    Constitutional: Awake, alert, cooperative, no apparent distress.    ENT: Normocephalic, without obvious abnormality, atraumatic, oral pharynx with moist mucus membranes, tonsils without erythema or exudates.  Eyes pupils are equal, round and reactive to light; extra occular movements intact.  Normal sclera.    Neck: Supple, symmetrical, trachea midline, no adenopathy.  Pulmonary: No increased work of breathing, good air exchange, clear to auscultation bilaterally, no crackles or wheezing. On nasal cannula.  Cardiovascular: Regular rate and rhythm, normal S1 and S2, no S3 or S4, and no murmur noted.  GI: Normal bowel sounds, soft, non-distended, non-tender.    Skin/Integumen: Clear.  Neuro: CN II-XII grossly intact.  Upper and lower extremities strength, coordination and sensation intact bilaterally.    Psych:  Alert and oriented x 3. Normal affect.  Extremities: No lower extremity edema noted, and calves are non-tender to palpation bilaterally. Dorsal pedal pulses and posterior tibial pulses palpable.  Ace wrap with RLE and ice pack to right knee.      Data   Results for orders placed or performed during the hospital encounter of 02/28/22 (from the past 24 hour(s))   Potassium   Result Value Ref Range    Potassium 4.4 3.4 - 5.3 mmol/L   Glucose   Result Value Ref Range    Glucose 139 (H) 70 - 99 mg/dL    Patient Fasting > 8hrs? Yes    Glucose by meter   Result Value Ref Range    GLUCOSE BY METER POCT 89 70 - 99 mg/dL   Glucose by meter   Result Value Ref Range    GLUCOSE BY METER POCT 83 70 - 99 mg/dL   Glucose by meter   Result Value Ref Range    GLUCOSE BY METER POCT 90 70 - 99 mg/dL   Glucose by meter   Result Value Ref Range    GLUCOSE BY METER POCT 110 (H) 70 - 99 mg/dL   Glucose by meter   Result Value Ref Range    GLUCOSE BY METER POCT 117 (H) 70 - 99 mg/dL   XR Knee Port Right 1/2 Views    Narrative    XR PORTABLE RIGHT KNEE ONE-TWO VIEWS   2/28/2022 3:09 PM     HISTORY: Knee  arthroplasty. Postop imaging.    COMPARISON: None.      Impression    IMPRESSION: Medial hemiarthroplasty in place. No evidence of  complication.       Jamia Martin NP  [facility]  Securely message with the Real Time [employer] Web Console (learn more here)  Text page via [employer] Paging/Directory

## 2022-02-28 NOTE — ANESTHESIA CARE TRANSFER NOTE
Patient: Yane Haas    Procedure: Procedure(s):  RIGHT UNICOMPARTMENTAL KNEE ARTHROPLASTY       Diagnosis: Osteoarthritis of right knee, unspecified osteoarthritis type [M17.11]  Diagnosis Additional Information: No value filed.    Anesthesia Type:   General     Note:    Oropharynx: oropharynx clear of all foreign objects  Level of Consciousness: drowsy  Oxygen Supplementation: face mask  Level of Supplemental Oxygen (L/min / FiO2): 6  Independent Airway: airway patency satisfactory and stable  Dentition: dentition unchanged  Vital Signs Stable: post-procedure vital signs reviewed and stable  Report to RN Given: handoff report given  Patient transferred to: PACU  Comments: To PACU: Drowsy, good airway, 02 face mask, VSS  Report to RN  Handoff Report: Identifed the Patient, Identified the Reponsible Provider, Reviewed the pertinent medical history, Discussed the surgical course, Reviewed Intra-OP anesthesia mangement and issues during anesthesia, Set expectations for post-procedure period and Allowed opportunity for questions and acknowledgement of understanding      Vitals:  Vitals Value Taken Time   BP 99/72 02/28/22 1450   Temp     Pulse 78 02/28/22 1451   Resp 9 02/28/22 1451   SpO2 99 % 02/28/22 1451   Vitals shown include unvalidated device data.    Electronically Signed By: SADE Calvillo CRNA  February 28, 2022  2:52 PM

## 2022-02-28 NOTE — BRIEF OP NOTE
[facility]    Brief Operative Note    Pre-operative diagnosis: Osteoarthritis of right knee, unspecified osteoarthritis type [M17.11]  Post-operative diagnosis Same as pre-operative diagnosis    Procedure: Procedure(s):  RIGHT UNICOMPARTMENTAL KNEE ARTHROPLASTY  Surgeon: Surgeon(s) and Role:     * Chace Galvin MD - Primary     * Deloris Salazar PA-C - Assisting  Anesthesia: Combined General with Femoral Nerve Block   Estimated Blood Loss: Less than 50 ml    Drains: None  Specimens: * No specimens in log *  Findings:   None.  Complications: None.  Implants:   Implant Name Type Inv. Item Serial No.  Lot No. LRB No. Used Action   BONE CEMENT SIMPLEX FULL DOSE 6191-1-001 - [id number] Cement, Bone BONE CEMENT SIMPLEX FULL DOSE 6191-1-001  [employer] [id number] Right 1 Implanted   KNEE UNI TIBIA TRAY MOBILE BEAR D5 RM/LL - [id number] Total Joint Component/Insert KNEE UNI TIBIA TRAY MOBILE BEAR D5 RM/LL  [employer] [id number] Right 1 Implanted   KNEE OXFORD UNI FEMORAL MED - [id number] Total Joint Component/Insert KNEE OXFORD UNI FEMORAL MED  [employer] [id number] Right 1 Implanted   OXFORD PARTIAL KNEE SYSTEM, ANATOMIC MENISCAL BEARING SIZE: MEDIUM, 3MM THICK    [employer] [id number] Right 1 Implanted

## 2022-02-28 NOTE — PROVIDER NOTIFICATION
MD Notification    Notified Person: MD    Notified Person Name: Dr. Galvin     Notification Date/Time: 2/28/22, 164    Notification Interaction: phone call     Purpose of Notification: Call placed to Dr. Galvin's office regarding diabetic orders. Patient is a Type 1 diabetic, no insulin or accucheck orders in place. No answer so left voicemail.     Orders Received: insulin and accucheck orders placed    Comments:

## 2022-02-28 NOTE — OP NOTE
Yane Haas  MRN: [id number]    YOB: 1960  AGE: 61 year old    DATE OF PROCEDURE: 2/28/2022    SURGEON:  Chace Galvin MD     FIRST ASSISTANT:  EDUIN Jain    PREOPERATIVE DIAGNOSIS:  Right knee medial compartment degenerative arthritis     POSTOPERATIVE DIAGNOSIS:  Right knee medial compartment degenerative arthritis     PROCEDURE:  Right unicompartmental knee arthroplasty     IMPLANTS:  Bossier [employer]) unicompartmental knee arthroplasty, Med femur, C tibia, 3 mm bearing     INDICATIONS:  Yane Haas  is a 61 year old patient with a 1 year history of progressive right knee pain refractory to an extensive conservative treatment approach. X-rays reveal severe  bone-on-bone  degenerative changes in the medial compartment with mild varus deformity. Valgus stress view shows no lateral compartment narrowing and no MCL contracture. Lateral view shows no posterior tibial wear. Lateral and sunrise views show minimal patellofemoral arthrosis.     Indications, risks, goals, and expectations of unicompartmental versus possible total knee arthroplasty were reviewed in detail preoperatively.      DESCRIPTION OF PROCEDURE:  Following induction of adductor canal femoral nerve block as well as spinal anesthesia, the right lower extremity was prepped and draped in the usual sterile fashion.  A  timeout  was preformed and the right knee was confirmed as the appropriate operative site.  The leg was exsanguinated with an Esmarch bandage and the tourniquet inflated to 275 mmHg.     A linear, slightly oblique, incision was made just to the medial side of the midline, beginning at the superior pole of the patella extending to the medial border of the tibial tubercle. Full thickness flaps were elevated above the extensor mechanism.  Medial parapatellar arthrotomy was performed with electrocautery.  This was extended distally to the medial border of the tibial tubercle and proximally into the mid portion of  the vastus medialis, in line with the muscle fibers for 2 cm.  The anterior aspect of the medial tibial plateau was exposed subperiosteally with care to preserve the insertions of the superficial and deep medial collateral ligament.  A portion of the retropatellar fat pad was excised for exposure.  Careful inspection was performed.  There were bone-on-bone changes in the medial compartment as anticipated.  Articular cartilage in the lateral and patellofemoral compartment were intact.  Anterior cruciate ligament intact.  The knee was determined to be suitable for a unicompartmental knee arthroplasty.     Peripheral and central osteophytes were removed with an osteotome and rongeur.  The femur was sized with the 1 mm spoons and a Med femoral component was selected.  The spoon was then connected to the extramedullary tibial alignment guide using the #4 G clamp.  The proximal tibial cutting guide was pinned and confirmed in appropriate position.  The vertical cut was performed with a reciprocating saw, between the lateral border of the medial femoral condyle and the medial border of the anterior cruciate ligament.  This was begun high on the medial tibial spine and in line with the mechanical axis.  The horizontal cut was performed with a sagittal saw with care to protect the deep medial collateral ligament.  The excised bony fragment was inspected and revealed the typical wear pattern for anteromedial osteoarthritis. The tibia was sized to a C.  The tibial tray fit appropriately with a 3 mm feeler gauge confirming satisfactory tibial resection.      Attention was turned to the femur.  A  hole was drilled 1 cm anterior to the PCL origin using a 4 mm drill bit, enlarged with the T-handled awl, and followed with the long intramedullary hannah.  The femoral drill guide was then attached to the intramedullary hannah using the  link  and centered on the medial femoral condyle.  The 4 and 6 mm drill bits were then advanced  into the distal aspect of the medial femoral condyle.  The posterior resection guide was impacted and the posterior osteotomy was performed with a sagittal saw with care to protect the medial collateral ligament.  The distal femur was prepared with the 0 spigot and large reamer.  Meniscectomy was performed with electrocautery.      Trial reduction was performed with a C tibia and Med femur.  The 3 mm feeler gauge fit nicely into the flexion gap.  The extension gap measured a 0.  The #3 spigot and reamer were used to remove an additional 3 mm of bone from the distal femur. With the next trial reduction the flexion gap measured a 3 and again the extension gap measured a 0. Therefore the #6 spigot was used to remove another 3 mm of bone from the distal femur. Repeat trial reduction now revealed symmetrical gap balance in flexion and extension.     The  unicorn  impingement guide was placed on the distal femur.  The cannulated reamer was used anteriorly and the slotted osteotome posteriorly to ensure that there was no residual bony impingement.  The C tibia was confirmed as the appropriate size.  This was pinned anatomically to the proximal tibia.  The keel slot was prepared with the toothbrush saw and gouge.  Care was taken to protect the anterior and posterior cortices of the proximal tibia. Trial reduction was now performed with a keeled tibial component.  The 3 mm feeler gauge fit very nicely into both the flexion and extension gaps.  The 3 mm trial bearing tracked appropriately throughout range of motion with no sign of impingement or instability.      Sclerotic bone of the distal femur was prepared with the 2 mm drill bit.  Bony surfaces were prepared with pulsatile lavage.  30 mL of 0.25% Marcaine with epinephrine was injected into the posterior capsule and periarticular soft tissues to assist with postoperative pain control.  Two components were cemented in sequence beginning with the C tibia followed by the Med  femur.  All extruded cement was carefully removed from the knee.  The knee was held in 45 degrees of flexion with a 4 mm feeler gauge in place to pressurize the components.  Once the cement was hard, the 3 mm feeler gauge was confirmed as the appropriate thickness. Careful inspection was performed to ensure that all extruded cement was removed from the knee.  The wound was copiously irrigated with normal saline using pulsatile lavage.  The 3 mm anatomic bearing was then snapped into place.  This was stable throughout range of motion.      The wound was closed in layers with interrupted #1 Ethibond x3 at the VMO tendon insertion, #2 PDO Stratafix in the remainder of the medial parapatellar retinaculum.  Subcutaneous tissues were closed with interrupted 2-0 Vicryl and skin with running 2-0 PDO Stratafix in the subcuticular layer followed by skin staples.  A sterile dressing was applied with an Ace bandage from the toes to the groin.     COMPLICATIONS:   None known    ESTIMATED BLOOD LOSS:  25 mL    TOTAL TOURNIQUET TIME:  94 minutes   Tranexamic acid 1950 mg po was given 90 minutes prior to surgery.    ANTIBOITICS:   Ancef 2 grams IV were given preoperatively, 20 minutes prior to the time of the incision.  This will be continued for 2 postoperative doses, i.e. to be discontinued within less than 24 hours from the time of surgery.  Vancomycin powder 1 gm was used during the wound closure.      DEEP VEIN THROMBOSIS PROPHYLAXIS:  PLAN:  1. Rapid mobilization, weight bearing as tolerated.  2. SCDs full time while in bed.  3. ASA 81 mg po BID, beginning day of surgery and continuing for 4 weeks.     DISPOSITION:  The patient returned to the PACU in stable condition.

## 2022-02-28 NOTE — ANESTHESIA PREPROCEDURE EVALUATION
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 Trouble breathing. Has tolerated percocet.     Compazine Other (See Comments)     Had as a child. Trouble breathing.     Tacrolimus       Social History     Tobacco Use     Smoking status: Never Smoker     Smokeless tobacco: Never Used   Substance Use Topics     Alcohol use: No      Wt Readings from Last 1 Encounters:   16 68.5 kg (151 lb)        Anesthesia Evaluation   Pt has had prior anesthetic. Type: General.    No history of anesthetic complications       ROS/MED HX  ENT/Pulmonary:     (+) asthma  (-) tobacco use   Neurologic:       Cardiovascular:     (+) Dyslipidemia hypertension-----    METS/Exercise Tolerance:     Hematologic:       Musculoskeletal:       GI/Hepatic:       Renal/Genitourinary:       Endo:     (+) type I DM,     Psychiatric/Substance Use:     (+) psychiatric history anxiety and depression     Infectious Disease:       Malignancy:       Other: Comment: Peyronie's disease; Psoriasis              OUTSIDE LABS:  CBC: No  results found for: WBC, HGB, HCT, PLT  BMP: No results found for: NA, POTASSIUM, CHLORIDE, CO2, BUN, CR, GLC  COAGS: No results found for: PTT, INR, FIBR  POC:   Lab Results   Component Value Date     (H) 06/29/2016     HEPATIC: No results found for: ALBUMIN, PROTTOTAL, ALT, AST, GGT, ALKPHOS, BILITOTAL, BILIDIRECT, KRYS  OTHER: No results found for: PH, LACT, A1C, GUCCI, PHOS, MAG, LIPASE, AMYLASE, TSH, T4, T3, CRP, SED    Anesthesia Plan    ASA Status:  2   NPO Status:  NPO Appropriate    Anesthesia Type: General.     - Airway: LMA   Induction: Intravenous.   Maintenance: Balanced.        Consents    Anesthesia Plan(s) and associated risks, benefits, and realistic alternatives discussed. Questions answered and patient/representative(s) expressed understanding.    - Discussed:     - Discussed with:  Patient         Postoperative Care    Pain management: IV analgesics, Peripheral nerve block (Single Shot), Multi-modal analgesia.   PONV prophylaxis: Ondansetron (or other 5HT-3), Background Propofol Infusion     Comments:    Other Comments: Pt requests no GA; Glucometer for glucose checks            Clay Hopper MD

## 2022-03-01 ENCOUNTER — APPOINTMENT (OUTPATIENT)
Dept: OCCUPATIONAL THERAPY | Facility: CLINIC | Age: 62
End: 2022-03-01
Attending: ORTHOPAEDIC SURGERY
Payer: COMMERCIAL

## 2022-03-01 ENCOUNTER — APPOINTMENT (OUTPATIENT)
Dept: PHYSICAL THERAPY | Facility: CLINIC | Age: 62
End: 2022-03-01
Attending: ORTHOPAEDIC SURGERY
Payer: COMMERCIAL

## 2022-03-01 VITALS
HEART RATE: 80 BPM | TEMPERATURE: 98.2 F | RESPIRATION RATE: 16 BRPM | OXYGEN SATURATION: 94 % | DIASTOLIC BLOOD PRESSURE: 70 MMHG | SYSTOLIC BLOOD PRESSURE: 108 MMHG | HEIGHT: 67 IN | BODY MASS INDEX: 25.88 KG/M2 | WEIGHT: 164.9 LBS

## 2022-03-01 LAB
ANION GAP SERPL CALCULATED.3IONS-SCNC: 3 MMOL/L (ref 3–14)
BUN SERPL-MCNC: 22 MG/DL (ref 7–30)
CALCIUM SERPL-MCNC: 8.1 MG/DL (ref 8.5–10.1)
CHLORIDE BLD-SCNC: 105 MMOL/L (ref 94–109)
CO2 SERPL-SCNC: 28 MMOL/L (ref 20–32)
CREAT SERPL-MCNC: 1.06 MG/DL (ref 0.66–1.25)
GFR SERPL CREATININE-BSD FRML MDRD: 80 ML/MIN/1.73M2
GLUCOSE BLD-MCNC: 131 MG/DL (ref 70–99)
GLUCOSE BLDC GLUCOMTR-MCNC: 112 MG/DL (ref 70–99)
GLUCOSE BLDC GLUCOMTR-MCNC: 151 MG/DL (ref 70–99)
HBA1C MFR BLD: 6.3 % (ref 0–5.6)
HGB BLD-MCNC: 11.4 G/DL (ref 13.3–17.7)
POTASSIUM BLD-SCNC: 4.2 MMOL/L (ref 3.4–5.3)
SODIUM SERPL-SCNC: 136 MMOL/L (ref 133–144)

## 2022-03-01 PROCEDURE — 83036 HEMOGLOBIN GLYCOSYLATED A1C: CPT | Performed by: NURSE PRACTITIONER

## 2022-03-01 PROCEDURE — 97535 SELF CARE MNGMENT TRAINING: CPT | Mod: GO | Performed by: OCCUPATIONAL THERAPIST

## 2022-03-01 PROCEDURE — 80048 BASIC METABOLIC PNL TOTAL CA: CPT | Performed by: NURSE PRACTITIONER

## 2022-03-01 PROCEDURE — 82962 GLUCOSE BLOOD TEST: CPT

## 2022-03-01 PROCEDURE — 97166 OT EVAL MOD COMPLEX 45 MIN: CPT | Mod: GO | Performed by: OCCUPATIONAL THERAPIST

## 2022-03-01 PROCEDURE — 250N000013 HC RX MED GY IP 250 OP 250 PS 637: Performed by: ORTHOPAEDIC SURGERY

## 2022-03-01 PROCEDURE — 250N000011 HC RX IP 250 OP 636: Performed by: ORTHOPAEDIC SURGERY

## 2022-03-01 PROCEDURE — 96372 THER/PROPH/DIAG INJ SC/IM: CPT | Performed by: NURSE PRACTITIONER

## 2022-03-01 PROCEDURE — 97116 GAIT TRAINING THERAPY: CPT | Mod: GP | Performed by: PHYSICAL THERAPIST

## 2022-03-01 PROCEDURE — 250N000012 HC RX MED GY IP 250 OP 636 PS 637: Performed by: NURSE PRACTITIONER

## 2022-03-01 PROCEDURE — 250N000013 HC RX MED GY IP 250 OP 250 PS 637: Performed by: PHYSICIAN ASSISTANT

## 2022-03-01 PROCEDURE — 97110 THERAPEUTIC EXERCISES: CPT | Mod: GP | Performed by: PHYSICAL THERAPIST

## 2022-03-01 PROCEDURE — 99213 OFFICE O/P EST LOW 20 MIN: CPT | Performed by: INTERNAL MEDICINE

## 2022-03-01 PROCEDURE — 36415 COLL VENOUS BLD VENIPUNCTURE: CPT | Performed by: NURSE PRACTITIONER

## 2022-03-01 PROCEDURE — 99207 PR CDG-CODE CATEGORY CHANGED: CPT | Performed by: INTERNAL MEDICINE

## 2022-03-01 PROCEDURE — 97530 THERAPEUTIC ACTIVITIES: CPT | Mod: GP | Performed by: PHYSICAL THERAPIST

## 2022-03-01 PROCEDURE — 85018 HEMOGLOBIN: CPT | Performed by: ORTHOPAEDIC SURGERY

## 2022-03-01 PROCEDURE — 250N000011 HC RX IP 250 OP 636: Performed by: INTERNAL MEDICINE

## 2022-03-01 PROCEDURE — 250N000013 HC RX MED GY IP 250 OP 250 PS 637: Performed by: NURSE PRACTITIONER

## 2022-03-01 RX ORDER — METOCLOPRAMIDE HYDROCHLORIDE 5 MG/ML
10 INJECTION INTRAMUSCULAR; INTRAVENOUS EVERY 6 HOURS
Status: DISCONTINUED | OUTPATIENT
Start: 2022-03-01 | End: 2022-03-01

## 2022-03-01 RX ORDER — METOCLOPRAMIDE HYDROCHLORIDE 5 MG/ML
10 INJECTION INTRAMUSCULAR; INTRAVENOUS ONCE
Status: COMPLETED | OUTPATIENT
Start: 2022-03-01 | End: 2022-03-01

## 2022-03-01 RX ORDER — HYDROXYZINE HYDROCHLORIDE 25 MG/1
25 TABLET, FILM COATED ORAL EVERY 6 HOURS PRN
Qty: 20 TABLET | Refills: 0 | Status: SHIPPED | OUTPATIENT
Start: 2022-03-01

## 2022-03-01 RX ORDER — HYDROMORPHONE HYDROCHLORIDE 2 MG/1
2-4 TABLET ORAL EVERY 4 HOURS PRN
Status: DISCONTINUED | OUTPATIENT
Start: 2022-03-01 | End: 2022-03-01 | Stop reason: HOSPADM

## 2022-03-01 RX ORDER — METOCLOPRAMIDE 5 MG/1
5 TABLET ORAL 4 TIMES DAILY PRN
Qty: 20 TABLET | Refills: 0 | Status: SHIPPED | OUTPATIENT
Start: 2022-03-01

## 2022-03-01 RX ORDER — HYDROMORPHONE HYDROCHLORIDE 2 MG/1
2-4 TABLET ORAL EVERY 4 HOURS PRN
Qty: 30 TABLET | Refills: 0 | Status: SHIPPED | OUTPATIENT
Start: 2022-03-01

## 2022-03-01 RX ADMIN — HYDROMORPHONE HYDROCHLORIDE 2 MG: 2 TABLET ORAL at 07:57

## 2022-03-01 RX ADMIN — METOCLOPRAMIDE HYDROCHLORIDE 10 MG: 5 INJECTION INTRAMUSCULAR; INTRAVENOUS at 11:36

## 2022-03-01 RX ADMIN — AZELASTINE HYDROCHLORIDE 1 SPRAY: 137 SPRAY, METERED NASAL at 08:29

## 2022-03-01 RX ADMIN — CEFAZOLIN 1 G: 1 INJECTION, POWDER, FOR SOLUTION INTRAMUSCULAR; INTRAVENOUS at 03:25

## 2022-03-01 RX ADMIN — OXYCODONE HYDROCHLORIDE 10 MG: 5 TABLET ORAL at 03:24

## 2022-03-01 RX ADMIN — ESCITALOPRAM OXALATE 10 MG: 10 TABLET ORAL at 08:27

## 2022-03-01 RX ADMIN — PREDNISOLONE ACETATE 1 DROP: 10 SUSPENSION/ DROPS OPHTHALMIC at 08:34

## 2022-03-01 RX ADMIN — ONDANSETRON 4 MG: 2 INJECTION INTRAMUSCULAR; INTRAVENOUS at 08:56

## 2022-03-01 RX ADMIN — POLYETHYLENE GLYCOL 3350 17 G: 17 POWDER, FOR SOLUTION ORAL at 08:27

## 2022-03-01 RX ADMIN — ASPIRIN 81 MG: 81 TABLET, COATED ORAL at 08:27

## 2022-03-01 RX ADMIN — INSULIN ASPART 3 UNITS: 100 INJECTION, SOLUTION INTRAVENOUS; SUBCUTANEOUS at 11:13

## 2022-03-01 RX ADMIN — SENNOSIDES AND DOCUSATE SODIUM 1 TABLET: 50; 8.6 TABLET ORAL at 08:27

## 2022-03-01 RX ADMIN — HYDROMORPHONE HYDROCHLORIDE 0.4 MG: 0.2 INJECTION, SOLUTION INTRAMUSCULAR; INTRAVENOUS; SUBCUTANEOUS at 09:33

## 2022-03-01 RX ADMIN — UMECLIDINIUM 1 PUFF: 62.5 AEROSOL, POWDER ORAL at 08:29

## 2022-03-01 RX ADMIN — HYDROXYZINE HYDROCHLORIDE 25 MG: 25 TABLET ORAL at 11:36

## 2022-03-01 NOTE — PROGRESS NOTES
Pt is alert and oriented x4, VSS. On 2L O2 nasal cannula attempted to ween, unsuccessful pt desatting down to 87%, IS taught and encouraged. CMS intact. Pt having pain in RLE, PRN oxycodone given as ordered (see MAR). Pt up in room with 1 assist with gait belt and walker. Voiding without difficulty. BS elevated, pt gave self 5 units of insulin for BS of 293, 2 units ordered, paged Walter Cabrera MD regarding pt PTA amount of insulin normally takes and additional amount of insulin received (See Note). BS decreasing after self administering 5 units of insulin and no additional coverage given at this time. Pt home regiment for insulin should be addressed during day. On call light a lot throughout shift. Continue to ween O2. Continue to monitor for pain. Continue POC.

## 2022-03-01 NOTE — PLAN OF CARE
Physical Therapy Discharge Summary    Reason for therapy discharge:    Discharged to home with outpatient therapy.    Progress towards therapy goal(s). See goals on Care Plan in [facility] electronic health record for goal details.  Goals partially met.  Barriers to achieving goals:   limited tolerance for therapy and discharge from facility.    Therapy recommendation(s):  PT recommended in OP setting to increase ROM, strength and functional mobility

## 2022-03-01 NOTE — PROGRESS NOTES
03/01/22 0901   Quick Adds   Type of Visit Initial Occupational Therapy Evaluation   Living Environment   People in Home spouse   Current Living Arrangements house   Home Accessibility stairs to enter home;stairs within home   Number of Stairs, Main Entrance 1   Stair Railings, Main Entrance none   Number of Stairs, Within Home, Primary eight   Stair Railings, Within Home, Primary railing on right side (ascending)   Transportation Anticipated family or friend will provide   Living Environment Comments spouse home 24/7 for assist   Self-Care   Usual Activity Tolerance moderate   Current Activity Tolerance fair   Equipment Currently Used at Home none   Fall history within last six months no   Activity/Exercise/Self-Care Comment pt was Ind with all mobility prior to surgery, Ind with all ADL's and IADL's    Instrumental Activities of Daily Living (IADL)   IADL Comments spouse can assist 24/7   General Information   Onset of Illness/Injury or Date of Surgery 02/28/22   Referring Physician Chace Galvin   Patient/Family Therapy Goal Statement (OT) home   Additional Occupational Profile Info/Pertinent History of Current Problem pt is s/p R unicompartmental knee arthroplasty   Existing Precautions/Restrictions fall   Right Lower Extremity (Weight-bearing Status) weight-bearing as tolerated (WBAT)   General Observations and Info Activity order: ambulate with assist 3x daily   Cognitive Status Examination   Cognitive Status Comments pt did not recall walking in hallway with PT yesterday   Pain Assessment   Patient Currently in Pain Yes, see Vital Sign flowsheet   Integumentary/Edema   Integumentary/Edema Comments post-op dressing on surgical site   Posture   Posture protracted shoulders   Range of Motion Comprehensive   Comment, General Range of Motion pt demonstrated functional BUE AROM during dressing task; RLE limited by surgical pain   Strength Comprehensive (MMT)   Comment, General Manual Muscle Testing (MMT)  Assessment pt demonstrated functional BUE strength during transfers; RLE limited by surgical pain   Bed Mobility   Bed Mobility supine-sit   Supine-Sit [place] (Bed Mobility) contact guard;verbal cues   Assistive Device (Bed Mobility) bed rails   Comment (Bed Mobility) HOB 45*   Transfers   Transfers sit-stand transfer;toilet transfer;shower transfer   Sit-Stand Transfer   Sit-Stand [place] (Transfers) contact guard;verbal cues   Assistive Device (Sit-Stand Transfers) walker, standard   Shower Transfer   Shower Transfer Comments tub/shower   Toilet Transfer   Type (Toilet Transfer) sit-stand;stand-sit   [place] Level (Toilet Transfer) contact guard;verbal cues   Assistive Device (Toilet Transfer) walker, standard   Balance   Balance Comments good seated; good ambulating in room with 2WW   Activities of Daily Living   BADL Assessment/Intervention upper body dressing;lower body dressing   Upper Body Dressing Assessment/Training   [place] Level (Upper Body Dressing) independent   Lower Body Dressing Assessment/Training   [place] Level (Lower Body Dressing) minimum assist (75% patient effort)   Assistive Devices (Lower Body Dressing) reacher;sock-aid   Position (Lower Body Dressing) edge of bed sitting   Clinical Impression   Criteria for Skilled Therapeutic Interventions Met (OT) Yes, treatment indicated   OT Diagnosis impaired ADLs   OT Problem List-Impairments impacting ADL problems related to;range of motion (ROM);strength;pain   Assessment of Occupational Performance 3-5 Performance Deficits   Identified Performance Deficits dressing, bathing, toileting, home chores   Planned Therapy Interventions (OT) ADL retraining;transfer training   Clinical Decision Making Complexity (OT) moderate complexity   Anticipated Equipment Needs Upon Discharge (OT) reacher;shower chair   Risk & Benefits of therapy have been explained evaluation/treatment results reviewed;care plan/treatment goals  reviewed;risks/benefits reviewed;participants included;patient;spouse/significant other   OT Discharge Planning   OT Rationale for DC Rec Defer to ortho MD.  Anticipate pt will be [place] LE dressing, SBA toilet transfer, ModA tub/shower transfer, A for home chores   Therapy Certification   Start of Care Date 03/01/22   Certification date from 03/01/22   Certification date to 03/01/22   Medical Diagnosis Unicompartmental knee arthroplasty   Total Evaluation Time (Minutes)   Total Evaluation Time (Minutes) 5

## 2022-03-01 NOTE — ANESTHESIA POSTPROCEDURE EVALUATION
Patient: Yane Haas    Procedure: Procedure(s):  RIGHT UNICOMPARTMENTAL KNEE ARTHROPLASTY       Anesthesia Type:  General    Note:  Disposition: Inpatient   Postop Pain Control: Uneventful            Sign Out: Well controlled pain   PONV: No   Neuro/Psych: Uneventful            Sign Out: Acceptable/Baseline neuro status   Airway/Respiratory: Uneventful            Sign Out: Acceptable/Baseline resp. status   CV/Hemodynamics: Uneventful            Sign Out: Acceptable CV status   Other NRE: NONE   DID A NON-ROUTINE EVENT OCCUR? No           Last vitals:  Vitals Value Taken Time   /63 02/28/22 1600   Temp 36.2  C (97.2  F) 02/28/22 1445   Pulse 85 02/28/22 1607   Resp 18 02/28/22 1607   SpO2 97 % 02/28/22 1606   Vitals shown include unvalidated device data.    Electronically Signed By: Edilberto Oropeza MD  February 28, 2022  8:16 PM

## 2022-03-01 NOTE — PROVIDER NOTIFICATION
Paged Waletr Cabrera via [employer] @ [phone]      Yane Haas [id number], Can you review pt PTA insulin orders. Pt reports getting more insulin at bedtime/mealtime than has ordered. Had 2 units ordered for bedtime , he self administered 5 units    Alysa CABRALES   [phone]

## 2022-03-01 NOTE — PLAN OF CARE
Goal Outcome Evaluation:  Goals resolved.         Pt left with wife, walker and medications filled and sent with. Discharge instructions provided as well.

## 2022-03-01 NOTE — PROGRESS NOTES
"   02/28/22 1800   Quick Adds   Type of Visit Initial PT Evaluation   Living Environment   People in Home spouse   Current Living Arrangements house   Home Accessibility stairs to enter home;stairs within home   Number of Stairs, Main Entrance 1   Stair Railings, Main Entrance none   Number of Stairs, Within Home, Primary eight   Stair Railings, Within Home, Primary railing on right side (ascending)   Transportation Anticipated family or friend will provide   Living Environment Comments spouse home 24/7 for assist   Self-Care   Usual Activity Tolerance moderate   Current Activity Tolerance fair   Equipment Currently Used at Home none   Fall history within last six months no   Activity/Exercise/Self-Care Comment pt was Ind with all mobility prior to surgery, Ind with all ADL's and IADL's    General Information   Onset of Illness/Injury or Date of Surgery 02/28/22   Referring Physician Chace Galvin MD   Patient/Family Therapy Goals Statement (PT) return home    Pertinent History of Current Problem (include personal factors and/or comorbidities that impact the POC) Pt is a 61 y.o. male s/p Right unicompartmental knee arthroplasty on 2/28/2022, POD#0   Existing Precautions/Restrictions fall   Weight-Bearing Status - RLE weight-bearing as tolerated   Cognition   Affect/Mental Status (Cognition) WFL;flat/blunted affect;low arousal/lethargic   Orientation Status (Cognition) oriented x 4   Pain Assessment   Patient Currently in Pain   (pt states knee feels like \"9/10\" pain )   Posture    Posture Forward head position;Protracted shoulders   Range of Motion (ROM)   Range of Motion ROM deficits secondary to surgical procedure;ROM deficits secondary to pain   ROM Comment appears grossly WFL, Right knee deficits secondary to post-surgical pain   Strength (Manual Muscle Testing)   Strength (Manual Muscle Testing) Able to perform L SLR;Deficits observed during functional mobility   Strength Comments not formally assesed, " deficits with RLE secondary to post surgical pain, appears grossly antigravity   Bed Mobility   Comment, (Bed Mobility) supine<>sit Mod-I    Transfers   Comment, (Transfers) sit<>stand with FWW and CGA   Gait/Stairs (Locomotion)   [place] Level (Gait) contact guard   Assistive Device (Gait) walker, front-wheeled   Distance in Feet (Required for LE Total Joints) 10' + 150'    Pattern (Gait) step-to   Comment, (Gait/Stairs) amb with FWW and CGA   Balance   Balance Comments sitting EOB ind, standing with FWW good balance, deficits with dynamic balance secondary to post surgical pain    Sensory Examination   Sensory Perception Comments pt denies deficits    Coordination   Coordination no deficits were identified   Muscle Tone   Muscle Tone no deficits were identified   Clinical Impression   Criteria for Skilled Therapeutic Intervention Yes, treatment indicated   PT Diagnosis (PT) impaired gait    Influenced by the following impairments deficits with functional ROM, strength, and balance, pain    Functional limitations due to impairments decreased ind with all functional mobility, decreased ind with ADL's and IADL's, decrease activity tolerance and fall risk    Clinical Presentation (PT Evaluation Complexity) Stable/Uncomplicated   Clinical Presentation Rationale PMH and clinical judgement   Clinical Decision Making (Complexity) low complexity   Planned Therapy Interventions (PT) bed mobility training;balance training;cryotherapy;gait training;home exercise program;patient/family education;ROM (range of motion);stair training;strengthening;stretching;transfer training   Anticipated Equipment Needs at Discharge (PT)   (pt to decide if wants hospital issued FWW)   Risk & Benefits of therapy have been explained evaluation/treatment results reviewed;care plan/treatment goals reviewed;risks/benefits reviewed;current/potential barriers reviewed;participants voiced agreement with care plan;participants included;patient    PT Discharge Planning   PT Discharge Recommendation (DC Rec)   (defer to ortho)   PT Rationale for DC Rec Pt is currently below baseline. Anticipate at time of discharge pt will be Mod-i with bed mobility, SBA with functional transfers using FWW, SBA amb with FWW and Adrianne for stairs. Pt lives with spouse who will be home 24/7 for support    PT Brief overview of current status bed mobility: mod-I, sit<>stand with FWW CGA, amb with FWW CGA   Plan of Care Review   Plan of Care Reviewed With patient   Total Evaluation Time   Total Evaluation Time (Minutes) 12

## 2022-03-01 NOTE — PROGRESS NOTES
[facility]    Hospitalist Progress Note    Interval History   - Nauseous and unable to tolerate PO this morning. Discussed with him, he does have DM1 so will try Reglan IV in case he has some gastric motility issues after surgery  - Defer pain control to surgery  - Discussed BG management, he doesn't need assistance  - Medically stable to discharge if patient can tolerate lunch. Partial med rec done    Assessment & Plan   Summary: Yane Haas is a 61 year old male with PMH DM Type 1, HTN, HLD, anxiety, obstructive lung disease, and OA of the right knee who was admitted on 2/28/22 for elective R TKA with Dr. Galvin. EBL of 25 ml and uncomplicated operative course. Hospitalist service was consulted to assist with post-op medical and diabetic management.     POD #0 s/p Right Unicompartmental Knee Arthroplasty  OA Right Knee  Uncomplicated operative course. EBL of 25 ml. Pain controlled with PRN meds.  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 PTA regimen of Tresiba 11 units at night and sliding scale insulin TID. Will repeat A1c in AM. Has had issues with hypoglycemia according to chart review from his last Endo visit on 2/24/22.   --Medium scale sliding scale insulin with carb coverage  -DM diet  -Long acting insulin at PTA dose of 11 units  -Adjust insulin for hyper/hypoglycemia  -Resume PTA eye drops  -Resume PTA regimen at discharge    Hypertension  Vitally stable. BP's in the low 100's over 60's. On lisinopril PTA. Will resume with hold parameters in place. Monitor VS.    Anxiety  Noted and stable. PTA lexapro.     Obstructive Lung Disease  Noted. Not on chronic oxygen at home. Currently on nasal cannula post op.   -Pulmonary  toileting  -Resume PTA Spiriva--will utilize hospital substitution  -Wean oxygen to maintain sats >90%  -Cough and deep breathe, IS    HLD  Noted. Resume statin.    DVT Prophylaxis: Pneumatic Compression Devices  Code Status: Full Code  PT/OT: ordered  Diet: Discharge Instruction - Regular Diet Adult  Moderate Consistent Carb (60 g CHO per Meal) Diet      Disposition: Expected discharge today or tomorrow    Yaya Raymond MD  Text Page  (7am to 6pm)  -Data reviewed today: I reviewed all new labs and imaging results over the last 24 hours.    Physical Exam   Temp: 98.2  F (36.8  C) Temp src: Oral BP: 108/70 Pulse: 80   Resp: 16 SpO2: 94 % O2 Device: None (Room air) Oxygen Delivery: 2 LPM  Vitals:    02/28/22 0946   Weight: 74.8 kg (164 lb 14.4 oz)     Vital Signs with Ranges  Temp:  [97.2  F (36.2  C)-98.2  F (36.8  C)] 98.2  F (36.8  C)  Pulse:  [72-84] 80  Resp:  [8-21] 16  BP: ()/(61-77) 108/70  SpO2:  [92 %-99 %] 94 %  I/O last 3 completed shifts:  In: 1640 [P.O.:240; I.V.:1400]  Out: 825 [Urine:800; Blood:25]  O2 requirements: none    Constitutional: Male appears nauseous, and in mild pain  HEENT: Eyes nonicteric, oral mucosa moist  Cardiovascular: RRR, normal S1/2, no m/r/g  Respiratory: CTAB, no wheezing or crackles  Vascular: 1+ RLE pitting edema  GI: Normoactive bowel sounds, nontender  Skin/Integumen: No rashes  Neuro/Psych: Appropriate affect and mood. A&Ox3, moves all extremities    Medications     sodium chloride Stopped (03/01/22 0201)       acetaminophen  975 mg Oral Q8H     aspirin  81 mg Oral BID     azelastine  1 spray Both Nostrils BID     brimonidine-timolol  1 drop Left Eye BID     escitalopram  10 mg Oral Daily     insulin aspart   Subcutaneous TID w/meals     insulin aspart  1-7 Units Subcutaneous TID AC     insulin aspart  1-5 Units Subcutaneous At Bedtime     insulin degludec  11 Units Subcutaneous At Bedtime     lisinopril  10 mg Oral At Bedtime     metoclopramide  10 mg  Intravenous Q6H     polyethylene glycol  17 g Oral Daily     prednisoLONE acetate  1 drop Left Eye BID     rosuvastatin  40 mg Oral At Bedtime     senna-docusate  1 tablet Oral BID     sodium chloride (PF)  3 mL Intracatheter Q8H     umeclidinium  1 puff Inhalation Daily       Data   Recent Labs   Lab 03/01/22  0716 03/01/22  0524 03/01/22  0149 02/28/22  1214 02/28/22  1017   HGB 11.4*  --   --   --   --      --   --   --   --    POTASSIUM 4.2  --   --   --  4.4   CHLORIDE 105  --   --   --   --    CO2 28  --   --   --   --    BUN 22  --   --   --   --    CR 1.06  --   --   --   --    ANIONGAP 3  --   --   --   --    GUCCI 8.1*  --   --   --   --    * 112* 151*   < > 139*    < > = values in this interval not displayed.       Imaging:   Recent Results (from the past 24 hour(s))   XR Knee Port Right 1/2 Views    Narrative    XR PORTABLE RIGHT KNEE ONE-TWO VIEWS   2/28/2022 3:09 PM     HISTORY: Knee arthroplasty. Postop imaging.    COMPARISON: None.      Impression    IMPRESSION: Medial hemiarthroplasty in place. No evidence of  complication.    ASHA KINCAID MD         SYSTEM ID:  [id number]

## 2022-03-01 NOTE — PROGRESS NOTES
Orthopedic Surgery    As per Ms. Ray below. Appreciate assistance from Jamia Martin NP  Doing well.  Instructions reviewed.  Home today.    MD Yane Ny  3/1/2022  Admit Date:  2022  POD 1 Day Post-Op  S/P Procedure(s):  RIGHT UNICOMPARTMENTAL KNEE ARTHROPLASTY    Patient is not feeling great, didn't sleep well and has been painful.  Tolerating oral intake, but hasn't had any breakfast yet. Long discussion regarding pain expectations.  Discussed blood sugars.  Discharge instructions reviewed.     Alert and orient to person, place, and time.  Vital Sign Ranges  Temperature Temp  Av.4  F (36.3  C)  Min: 96.8  F (36  C)  Max: 97.9  F (36.6  C)   Blood pressure Systolic (24hrs), Av , Min:97 , Max:126        Diastolic (24hrs), Av, Min:61, Max:84      Pulse Pulse  Av.1  Min: 61  Max: 84   Respirations Resp  Av.8  Min: 8  Max: 21   Pulse oximetry SpO2  Av.2 %  Min: 92 %  Max: 99 %       Right lower extremity ACE wrap is clean, dry, and intact. Minimal erythema of the surrounding skin.  Bilateral calves are soft, non-tender.  right lower extremity is NVI.    Labs:  Recent Labs   Lab Test 22  1017   POTASSIUM 4.4     Recent Labs   Lab Test 22  0716   HGB 11.4*     No results for input(s): INR in the last 89334 hours.  No results for input(s): PLT in the last 16788 hours.    A/P  1. S/p right UKA   Continue aspirin 81 bid for DVT prophylaxis.     Mobilize with PT/OT WBAT.     Will try dilaudid for pain control.    2. Disposition   Anticipate d/c to home this afternoon after pain control.    Kjerstin L Foss, PA-C

## 2022-03-01 NOTE — PLAN OF CARE
Occupational Therapy Discharge Summary    Reason for therapy discharge:    All goals and outcomes met, no further needs identified.    Progress towards therapy goal(s). See goals on Care Plan in [facility] electronic health record for goal details.  Goals met    Therapy recommendation(s):    No further therapy is recommended.

## 2022-03-01 NOTE — PLAN OF CARE
Patient alert and oriented x4. VSS on 2L/NC - wean as able, baseline RA. Encourage use of IS. C/o pain to right knee, PRN oxycodone given x1, ice applied, brace on. Dressing CDI. CMS intact. Not oob yet, PT to see yet this evening. DTV, passing gas, tolerating diet. Blood sugar 127 this evening, type 1 diabetic. PIV x1 - NS infusing at 125 mL/hr. C/o dizziness with position changes - monitor. Discharge pending.

## 2022-03-01 NOTE — PLAN OF CARE
Goal Outcome Evaluation:          Patient will be discharging soon to home with his wife. Wife here to drive patient home. Pt sleeping now post medication, no concerns.

## 2022-06-30 ENCOUNTER — APPOINTMENT (OUTPATIENT)
Dept: URBAN - METROPOLITAN AREA CLINIC 257 | Age: 62
Setting detail: DERMATOLOGY
End: 2022-06-30

## 2022-06-30 DIAGNOSIS — D22 MELANOCYTIC NEVI: ICD-10-CM

## 2022-06-30 DIAGNOSIS — D18.0 HEMANGIOMA: ICD-10-CM

## 2022-06-30 DIAGNOSIS — L50.3 DERMATOGRAPHIC URTICARIA: ICD-10-CM

## 2022-06-30 DIAGNOSIS — L82.1 OTHER SEBORRHEIC KERATOSIS: ICD-10-CM

## 2022-06-30 DIAGNOSIS — Z71.89 OTHER SPECIFIED COUNSELING: ICD-10-CM

## 2022-06-30 DIAGNOSIS — L57.8 OTHER SKIN CHANGES DUE TO CHRONIC EXPOSURE TO NONIONIZING RADIATION: ICD-10-CM

## 2022-06-30 DIAGNOSIS — L50.1 IDIOPATHIC URTICARIA: ICD-10-CM

## 2022-06-30 DIAGNOSIS — L81.4 OTHER MELANIN HYPERPIGMENTATION: ICD-10-CM

## 2022-06-30 PROBLEM — D22.5 MELANOCYTIC NEVI OF TRUNK: Status: ACTIVE | Noted: 2022-06-30

## 2022-06-30 PROBLEM — D18.01 HEMANGIOMA OF SKIN AND SUBCUTANEOUS TISSUE: Status: ACTIVE | Noted: 2022-06-30

## 2022-06-30 PROCEDURE — 99204 OFFICE O/P NEW MOD 45 MIN: CPT

## 2022-06-30 PROCEDURE — OTHER ORDER TESTS: OTHER

## 2022-06-30 PROCEDURE — OTHER PRESCRIPTION: OTHER

## 2022-06-30 PROCEDURE — OTHER MIPS QUALITY: OTHER

## 2022-06-30 PROCEDURE — OTHER PRESCRIPTION MEDICATION MANAGEMENT: OTHER

## 2022-06-30 PROCEDURE — OTHER COUNSELING: OTHER

## 2022-06-30 RX ORDER — MONTELUKAST SODIUM 10 MG/1
TABLET ORAL
Qty: 30 | Refills: 2 | Status: ERX | COMMUNITY
Start: 2022-06-30

## 2022-06-30 ASSESSMENT — LOCATION SIMPLE DESCRIPTION DERM
LOCATION SIMPLE: RIGHT UPPER BACK
LOCATION SIMPLE: LOWER BACK
LOCATION SIMPLE: UPPER BACK
LOCATION SIMPLE: LEFT UPPER BACK
LOCATION SIMPLE: LEFT LOWER BACK

## 2022-06-30 ASSESSMENT — LOCATION DETAILED DESCRIPTION DERM
LOCATION DETAILED: RIGHT SUPERIOR MEDIAL UPPER BACK
LOCATION DETAILED: LEFT MEDIAL UPPER BACK
LOCATION DETAILED: LEFT INFERIOR MEDIAL MIDBACK
LOCATION DETAILED: INFERIOR THORACIC SPINE
LOCATION DETAILED: SUPERIOR LUMBAR SPINE
LOCATION DETAILED: LEFT INFERIOR MEDIAL UPPER BACK

## 2022-06-30 ASSESSMENT — LOCATION ZONE DERM: LOCATION ZONE: TRUNK

## 2022-06-30 NOTE — PROCEDURE: PRESCRIPTION MEDICATION MANAGEMENT
Detail Level: Zone
Initiate Treatment: Zantac 2x/day, Allegra 3x/day
Render In Strict Bullet Format?: No

## 2022-06-30 NOTE — HPI: ITCHING
What Type Of Note Output Would You Prefer (Optional)?: Standard Output
On A Scale Of 0 To 10 How Would You Rate Your Itching?: 10
How Did Your Itching Occur?: sudden in onset (over a period of weeks to a few months)
How Severe Is Your Itching?: moderate
Additional History: Pt states he is incredibly miserable and it only seems to be getting worse. He says this only happens when he gets out of the shower in the morning and only lasts about an hour. He has tried changing all of his soaps and shampoos with no luck.

## 2022-09-06 ENCOUNTER — APPOINTMENT (OUTPATIENT)
Dept: URBAN - METROPOLITAN AREA CLINIC 259 | Age: 62
Setting detail: DERMATOLOGY
End: 2022-09-06

## 2022-09-06 DIAGNOSIS — L50.1 IDIOPATHIC URTICARIA: ICD-10-CM

## 2022-09-06 PROCEDURE — OTHER ADDITIONAL NOTES: OTHER

## 2022-09-06 PROCEDURE — OTHER COUNSELING: OTHER

## 2022-09-06 PROCEDURE — OTHER MIPS QUALITY: OTHER

## 2022-09-06 PROCEDURE — 99212 OFFICE O/P EST SF 10 MIN: CPT

## 2022-09-06 ASSESSMENT — LOCATION DETAILED DESCRIPTION DERM: LOCATION DETAILED: STERNUM

## 2022-09-06 ASSESSMENT — LOCATION ZONE DERM: LOCATION ZONE: TRUNK

## 2022-09-06 ASSESSMENT — LOCATION SIMPLE DESCRIPTION DERM: LOCATION SIMPLE: CHEST

## 2022-09-06 NOTE — PROCEDURE: ADDITIONAL NOTES
Additional Notes: Due the pt having no physical rash- this seems more in the allergy field. Referred pt to Dr Nguyễn for an allergy consult; after his appt with dr nguyễn, we will come up with a care plan.
Render Risk Assessment In Note?: no
Detail Level: Simple

## 2023-06-23 ENCOUNTER — TRANSFERRED RECORDS (OUTPATIENT)
Dept: HEALTH INFORMATION MANAGEMENT | Facility: CLINIC | Age: 63
End: 2023-06-23
Payer: COMMERCIAL

## 2023-06-28 ENCOUNTER — TRANSCRIBE ORDERS (OUTPATIENT)
Dept: OTHER | Age: 63
End: 2023-06-28

## 2023-06-28 DIAGNOSIS — L50.3 DERMATOGRAPHISM: Primary | ICD-10-CM

## 2023-06-29 ENCOUNTER — TELEPHONE (OUTPATIENT)
Dept: DERMATOLOGY | Facility: CLINIC | Age: 63
End: 2023-06-29

## 2023-06-29 NOTE — TELEPHONE ENCOUNTER
Health Call Center    Phone Message    May a detailed message be left on voicemail: yes     Reason for Call: Appointment Intake      Referring Provider Name:   Buck Marin MD   [facility], [address]  [address]  [place] [address]  Phone: [phone]  Fax: [phone]    Diagnosis and/or Symptoms:   Dermatographism  Pruritus      Pt specifically being Referred to Dr Vega    Pt wiling to see her in either [place] or [place] (whichever is soonest)    Pt said Dr Marin has spoken with Dr Vega about this and was under the impression that Dr Vega could see and treat him for this and agreed to take him on as a Pt    All Records with Dr Marin's office    Dx not in protocols for Dermatographism and no openings that I could find    Please review and call Pt to schedule at either location    Thank you      Action Taken: Message routed to:  [facility] DERM    Travel Screening: Not Applicable

## 2023-07-10 NOTE — TELEPHONE ENCOUNTER
Email sent to Dr. Vega to advise on if and when she can see patient as a new referral patient    May Perry LPN

## (undated) DEVICE — WRAP EZY KNEE

## (undated) DEVICE — SU ETHIBOND 1 CTX 30" X865H

## (undated) DEVICE — DRAPE SHEET REV FOLD 3/4 9349

## (undated) DEVICE — BONE CEMENT MIXEVAC HI VAC W/CARTRIDGE 0306-563-000

## (undated) DEVICE — GLOVE PROTEXIS BLUE W/NEU-THERA 7.5  2D73EB75

## (undated) DEVICE — NDL SPINAL 18GA 3.5" 405184

## (undated) DEVICE — SU VICRYL 2-0 CT-1 27" UND J259H

## (undated) DEVICE — SUTURE STRATAFIX SPIRAL 3-0 SXMD2B412

## (undated) DEVICE — SUCTION IRR SYSTEM W/O TIP INTERPULSE HANDPIECE 0210-100-000

## (undated) DEVICE — HOOD FLYTE W/PEELAWAY 408-800-100

## (undated) DEVICE — MANIFOLD NEPTUNE 4 PORT 700-20

## (undated) DEVICE — LINEN TOWEL PACK X5 5464

## (undated) DEVICE — GLOVE PROTEXIS POWDER FREE 8.0 ORTHOPEDIC 2D73ET80

## (undated) DEVICE — BLADE OXFORD PARTIAL KNEE STAB

## (undated) DEVICE — SOL WATER IRRIG 1000ML BOTTLE 2F7114

## (undated) DEVICE — PACK TOTAL KNEE SOP15TKFSD

## (undated) DEVICE — SYR 50ML LL W/O NDL 309653

## (undated) DEVICE — DRAPE IOBAN INCISE 23X17" 6650EZ

## (undated) DEVICE — SOL NACL 0.9% IRRIG 3000ML BAG 2B7477

## (undated) DEVICE — SU VICRYL 1 CTX CR 8X18" J765D

## (undated) DEVICE — GLOVE PROTEXIS MICRO 7.0  2D73PM70

## (undated) DEVICE — BONE CLEANING TIP INTERPULSE  0210-010-000

## (undated) DEVICE — GLOVE PROTEXIS BLUE W/NEU-THERA 8.0  2D73EB80

## (undated) DEVICE — DRAPE STERI U 1015

## (undated) DEVICE — ESU GROUND PAD UNIVERSAL W/O CORD

## (undated) RX ORDER — HYDROMORPHONE HYDROCHLORIDE 1 MG/ML
INJECTION, SOLUTION INTRAMUSCULAR; INTRAVENOUS; SUBCUTANEOUS
Status: DISPENSED
Start: 2022-02-28

## (undated) RX ORDER — ACETAMINOPHEN 325 MG/1
TABLET ORAL
Status: DISPENSED
Start: 2022-02-28

## (undated) RX ORDER — TRANEXAMIC ACID 650 MG/1
TABLET ORAL
Status: DISPENSED
Start: 2022-02-28

## (undated) RX ORDER — BUPIVACAINE HYDROCHLORIDE AND EPINEPHRINE 2.5; 5 MG/ML; UG/ML
INJECTION, SOLUTION EPIDURAL; INFILTRATION; INTRACAUDAL; PERINEURAL
Status: DISPENSED
Start: 2022-02-28

## (undated) RX ORDER — VANCOMYCIN HYDROCHLORIDE 1 G/20ML
INJECTION, POWDER, LYOPHILIZED, FOR SOLUTION INTRAVENOUS
Status: DISPENSED
Start: 2022-02-28

## (undated) RX ORDER — GLYCOPYRROLATE 0.2 MG/ML
INJECTION, SOLUTION INTRAMUSCULAR; INTRAVENOUS
Status: DISPENSED
Start: 2022-02-28

## (undated) RX ORDER — PROPOFOL 10 MG/ML
INJECTION, EMULSION INTRAVENOUS
Status: DISPENSED
Start: 2022-02-28

## (undated) RX ORDER — CEFAZOLIN SODIUM/WATER 2 G/20 ML
SYRINGE (ML) INTRAVENOUS
Status: DISPENSED
Start: 2022-02-28

## (undated) RX ORDER — ONDANSETRON 2 MG/ML
INJECTION INTRAMUSCULAR; INTRAVENOUS
Status: DISPENSED
Start: 2022-02-28

## (undated) RX ORDER — FENTANYL CITRATE 0.05 MG/ML
INJECTION, SOLUTION INTRAMUSCULAR; INTRAVENOUS
Status: DISPENSED
Start: 2022-02-28

## (undated) RX ORDER — FENTANYL CITRATE 50 UG/ML
INJECTION, SOLUTION INTRAMUSCULAR; INTRAVENOUS
Status: DISPENSED
Start: 2022-02-28

## (undated) RX ORDER — CELECOXIB 200 MG/1
CAPSULE ORAL
Status: DISPENSED
Start: 2022-02-28

## (undated) RX ORDER — LIDOCAINE HYDROCHLORIDE 20 MG/ML
INJECTION, SOLUTION EPIDURAL; INFILTRATION; INTRACAUDAL; PERINEURAL
Status: DISPENSED
Start: 2022-02-28